# Patient Record
Sex: MALE | Race: WHITE | NOT HISPANIC OR LATINO | Employment: FULL TIME | URBAN - METROPOLITAN AREA
[De-identification: names, ages, dates, MRNs, and addresses within clinical notes are randomized per-mention and may not be internally consistent; named-entity substitution may affect disease eponyms.]

---

## 2017-04-06 ENCOUNTER — OFFICE VISIT (OUTPATIENT)
Dept: PODIATRY | Facility: CLINIC | Age: 42
End: 2017-04-06
Payer: COMMERCIAL

## 2017-04-06 VITALS — BODY MASS INDEX: 27.52 KG/M2 | HEIGHT: 76 IN | WEIGHT: 226 LBS

## 2017-04-06 DIAGNOSIS — M77.52 BURSITIS OF LEFT FOOT: Primary | ICD-10-CM

## 2017-04-06 PROCEDURE — 99203 OFFICE O/P NEW LOW 30 MIN: CPT | Performed by: PODIATRIST

## 2017-04-06 NOTE — LETTER
4/6/2017       RE: Tru Almanza  [address] [address] [facility] [address]           Dear Colleague,    Thank you for referring your patient, Tru Almanza, to the [facility]. Please see a copy of my visit note below.    PATIENT HISTORY:  Tru Almanza is a 41 year old male who presents to clinic for a painful left foot .  The patient describes the pain as sharp stabbing.  The patient relates pain is located around the fifth metatarsophalangeal joint on the left foot.  The patient relates the pain has been present for the past several weeks.  The patient relates pain with ambulation.  The patient has tried different shoes and questions with little relief.       REVIEW OF SYSTEMS:  Constitutional, HEENT, cardiovascular, pulmonary, GI, , musculoskeletal, neuro, skin, endocrine and psych systems are negative, except as otherwise noted.     PAST MEDICAL HISTORY:   Past Medical History:   Diagnosis Date     Pericarditis         PAST SURGICAL HISTORY:   Past Surgical History:   Procedure Laterality Date     VASECTOMY  12/12/2011    Procedure:VASECTOMY; Surgical Exploration of the Scrotum,Excision of Vas Deferens Vasectomy Clip,Vasectomy; Surgeon:ALFONSO MCGOVERN; Location:[facility] OR        MEDICATIONS:   Current Outpatient Prescriptions:      order for DME, Equipment being ordered: Dynaflex insert, Disp: 1 Units, Rfl: 0     ALLERGIES:    Allergies   Allergen Reactions     Erythromycin         SOCIAL HISTORY:   Social History     Social History     Marital status:      Spouse name: N/A     Number of children: N/A     Years of education: N/A     Occupational History     Not on file.     Social History Main Topics     Smoking status: Never Smoker     Smokeless tobacco: Never Used     Alcohol use No     Drug use: No     Sexual activity: Yes     Partners: Female     Other Topics Concern     Not on file     Social History Narrative        FAMILY HISTORY:   Family History  "  Problem Relation Age of Onset     Thyroid Disease Mother      Hypertension Father      Breast Cancer Maternal Grandmother         EXAM:Vitals: Ht 1.93 m (6' 4\")  Wt 102.5 kg (226 lb)  BMI 27.51 kg/m2  BMI= Body mass index is 27.51 kg/(m^2).    Weight management plan: Patient was referred to their PCP to discuss a diet and exercise plan.    General appearance: Patient is alert and fully cooperative with history & exam.  No sign of distress is noted during the visit.     Psychiatric: Affect is pleasant & appropriate.  Patient appears motivated to improve health.     Respiratory: Breathing is regular & unlabored while sitting.     HEENT: Hearing is intact to spoken word.  Speech is clear.  No gross evidence of visual impairment that would impact ambulation.     Dermatologic: Skin is intact to both lower extremities without significant lesions, rash or abrasion.  No paronychia or evidence of soft tissue infection is noted.     Vascular: DP & PT pulses are intact & regular bilaterally.  No significant edema or varicosities noted.  CFT and skin temperature is normal to both lower extremities.     Neurologic: Lower extremity sensation is intact to light touch.  No evidence of weakness or contracture in the lower extremities.  No evidence of neuropathy.     Musculoskeletal: Patient is ambulatory without assistive device or brace.  No gross ankle deformity noted.  No foot or ankle joint effusion is noted.  Noted pain on palpation on the plantar lateral aspect of the fifth metatarsophalangeal joint on the left foot. One can palpate a firm soft tissue mass in this area. No surrounding erythema noted.       ASSESSMENT / PLAN:     ICD-10-CM    1. Bursitis of left foot M71.572 order for [facility]     SPORTS MEDICINE REFERRAL       I have explained to Tru  about the conditions.  We discussed the nature of the condition as well as the treatment plan and expected length of recovery.  At this time, the patient was referred to Dr." Ciro Glover for an ultrasound-guided steroid injection of the bursa on the left foot. The patient was instructed to return to the office if symptoms do not resolve.      Disclaimer: This note consists of symbols derived from keyboarding, dictation and/or voice recognition software. As a result, there may be errors in the script that have gone undetected. Please consider this when interpreting information found in this chart.       MARYANA Chisholm D.P.M., F.A.C.F.A.S.        Again, thank you for allowing me to participate in the care of your patient.        Sincerely,              Yariel Chisholm DPM

## 2017-04-06 NOTE — PATIENT INSTRUCTIONS
Initial musculoskeletal treatment recommendation:    1.  Stretch the calf muscles as instructed once an hour.  2.  Ice the injured area in the evening; 20 min on/off.  3.  Take antiinflammatory medication as directed.  4.  Massage the soft tissues around the injured area in the morning to loosen the tissue  5.  Wear supportive foot wear    If no improvement in symptoms within four to six weeks, return to clinic for reevaluation.

## 2017-04-06 NOTE — MR AVS SNAPSHOT
After Visit Summary   4/6/2017    Tru Almanza    MRN: [id number]           Patient Information     Date Of Birth          1975        Visit Information        Provider Department      4/6/2017 9:40 AM Yariel Chisholm DPM [facility]        Today's Diagnoses     Bursitis of left foot    -  1      Care Instructions    Initial musculoskeletal treatment recommendation:    1.  Stretch the calf muscles as instructed once an hour.  2.  Ice the injured area in the evening; 20 min on/off.  3.  Take antiinflammatory medication as directed.  4.  Massage the soft tissues around the injured area in the morning to loosen the tissue  5.  Wear supportive foot wear    If no improvement in symptoms within four to six weeks, return to clinic for reevaluation.          Follow-ups after your visit        Additional Services     SPORTS MEDICINE REFERRAL       Your provider has referred you to:  Dr. Ciro Glover, FMG: [facility] [phone]   [email or web address]  ULTRASOUND GUIDED INJECTION OF BURSA LEFT FOOT  Please be aware that coverage of these services is subject to the terms and limitations of your health insurance plan.  Call member services at your health plan with any benefit or coverage questions.      Please bring the following to your appointment:    >>   Any x-rays, CTs or MRIs which have been performed.  Contact the facility where they were done to arrange for  prior to your scheduled appointment.  Any new CT, MRI or other procedures ordered by your specialist must be performed at a [place] facility or coordinated by your clinic's referral office.    >>   List of current medications   >>   This referral request   >>   Any documents/labs given to you for this referral                  Follow-up notes from your care team     Return in about 3 months  "(around 7/6/2017), or if symptoms worsen or fail to improve.      Who to contact     If you have questions or need follow up information about today's clinic visit or your schedule please contact [facility] directly at [phone].  Normal or non-critical lab and imaging results will be communicated to you by MyChart, letter or phone within 4 business days after the clinic has received the results. If you do not hear from us within 7 days, please contact the clinic through [employer] or phone. If you have a critical or abnormal lab result, we will notify you by phone as soon as possible.  Submit refill requests through [employer] or call your pharmacy and they will forward the refill request to us. Please allow 3 business days for your refill to be completed.          Additional Information About Your Visit        [facility] Information     [employer] gives you secure access to your electronic health record. If you see a primary care provider, you can also send messages to your care team and make appointments. If you have questions, please call your primary care clinic.  If you do not have a primary care provider, please call [phone] and they will assist you.        Care EveryWhere ID     This is your Care EveryWhere ID. This could be used by other organizations to access your [place] medical records  [id number]        Your Vitals Were     Height BMI (Body Mass Index)                1.93 m (6' 4\") 27.51 kg/m2           Blood Pressure from Last 3 Encounters:   10/11/16 124/72   09/14/16 120/70   05/13/14 120/76    Weight from Last 3 Encounters:   04/06/17 102.5 kg (226 lb)   10/11/16 96.1 kg (211 lb 12.8 oz)   09/14/16 97.4 kg (214 lb 12.8 oz)              We Performed the Following     SPORTS MEDICINE REFERRAL          Today's Medication Changes          These changes are accurate as of: 4/6/17 10:07 AM.  If you have any questions, ask your nurse or doctor.               Start taking " these medicines.        Dose/Directions    order for DME   Used for:  Bursitis of left foot   Started by:  Yariel Chisholm DPM        Equipment being ordered: Dynaflex insert   Quantity:  1 Units   Refills:  0            Where to get your medicines      Some of these will need a paper prescription and others can be bought over the counter.  Ask your nurse if you have questions.     Bring a paper prescription for each of these medications     order for DME                Primary Care Provider Office Phone # Fax #    Dori Avilez [phone]       [facility] [address] [place] [id number]        Thank you!     Thank you for choosing [facility]  for your care. Our goal is always to provide you with excellent care. Hearing back from our patients is one way we can continue to improve our services. Please take a few minutes to complete the written survey that you may receive in the mail after your visit with us. Thank you!             Your Updated Medication List - Protect others around you: Learn how to safely use, store and throw away your medicines at www.disposemymeds.org.          This list is accurate as of: 4/6/17 10:07 AM.  Always use your most recent med list.                   Brand Name Dispense Instructions for use    order for DME     1 Units    Equipment being ordered: Dynaflex insert

## 2017-04-07 NOTE — PROGRESS NOTES
"PATIENT HISTORY:  Tru Almanza is a 41 year old male who presents to clinic for a painful left foot .  The patient describes the pain as sharp stabbing.  The patient relates pain is located around the fifth metatarsophalangeal joint on the left foot.  The patient relates the pain has been present for the past several weeks.  The patient relates pain with ambulation.  The patient has tried different shoes and questions with little relief.       REVIEW OF SYSTEMS:  Constitutional, HEENT, cardiovascular, pulmonary, GI, , musculoskeletal, neuro, skin, endocrine and psych systems are negative, except as otherwise noted.     PAST MEDICAL HISTORY:   Past Medical History:   Diagnosis Date     Pericarditis         PAST SURGICAL HISTORY:   Past Surgical History:   Procedure Laterality Date     VASECTOMY  12/12/2011    Procedure:VASECTOMY; Surgical Exploration of the Scrotum,Excision of Vas Deferens Vasectomy Clip,Vasectomy; Surgeon:ALFONSO MCGOVERN; Location:[facility] OR        MEDICATIONS:   Current Outpatient Prescriptions:      order for DME, Equipment being ordered: Dynaflex insert, Disp: 1 Units, Rfl: 0     ALLERGIES:    Allergies   Allergen Reactions     Erythromycin         SOCIAL HISTORY:   Social History     Social History     Marital status:      Spouse name: N/A     Number of children: N/A     Years of education: N/A     Occupational History     Not on file.     Social History Main Topics     Smoking status: Never Smoker     Smokeless tobacco: Never Used     Alcohol use No     Drug use: No     Sexual activity: Yes     Partners: Female     Other Topics Concern     Not on file     Social History Narrative        FAMILY HISTORY:   Family History   Problem Relation Age of Onset     Thyroid Disease Mother      Hypertension Father      Breast Cancer Maternal Grandmother         EXAM:Vitals: Ht 1.93 m (6' 4\")  Wt 102.5 kg (226 lb)  BMI 27.51 kg/m2  BMI= Body mass index is 27.51 kg/(m^2).    Weight management plan: " Patient was referred to their PCP to discuss a diet and exercise plan.    General appearance: Patient is alert and fully cooperative with history & exam.  No sign of distress is noted during the visit.     Psychiatric: Affect is pleasant & appropriate.  Patient appears motivated to improve health.     Respiratory: Breathing is regular & unlabored while sitting.     HEENT: Hearing is intact to spoken word.  Speech is clear.  No gross evidence of visual impairment that would impact ambulation.     Dermatologic: Skin is intact to both lower extremities without significant lesions, rash or abrasion.  No paronychia or evidence of soft tissue infection is noted.     Vascular: DP & PT pulses are intact & regular bilaterally.  No significant edema or varicosities noted.  CFT and skin temperature is normal to both lower extremities.     Neurologic: Lower extremity sensation is intact to light touch.  No evidence of weakness or contracture in the lower extremities.  No evidence of neuropathy.     Musculoskeletal: Patient is ambulatory without assistive device or brace.  No gross ankle deformity noted.  No foot or ankle joint effusion is noted.  Noted pain on palpation on the plantar lateral aspect of the fifth metatarsophalangeal joint on the left foot. One can palpate a firm soft tissue mass in this area. No surrounding erythema noted.       ASSESSMENT / PLAN:     ICD-10-CM    1. Bursitis of left foot M71.572 order for DME     SPORTS MEDICINE REFERRAL       I have explained to Tru  about the conditions.  We discussed the nature of the condition as well as the treatment plan and expected length of recovery.  At this time, the patient was referred to Dr. Ciro Glover for an ultrasound-guided steroid injection of the bursa on the left foot. The patient was instructed to return to the office if symptoms do not resolve.      Disclaimer: This note consists of symbols derived from keyboarding, dictation and/or voice recognition  software. As a result, there may be errors in the script that have gone undetected. Please consider this when interpreting information found in this chart.       MARYANA Chisholm D.P.M., CHARLENE.F.SVETLANAS.

## 2017-04-12 ENCOUNTER — OFFICE VISIT (OUTPATIENT)
Dept: ORTHOPEDICS | Facility: CLINIC | Age: 42
End: 2017-04-12
Payer: COMMERCIAL

## 2017-04-12 VITALS
HEIGHT: 76 IN | BODY MASS INDEX: 27.52 KG/M2 | SYSTOLIC BLOOD PRESSURE: 118 MMHG | DIASTOLIC BLOOD PRESSURE: 78 MMHG | WEIGHT: 226 LBS

## 2017-04-12 DIAGNOSIS — M77.52 BURSITIS OF LEFT FOOT: Primary | ICD-10-CM

## 2017-04-12 PROCEDURE — 20606 DRAIN/INJ JOINT/BURSA W/US: CPT | Mod: LT | Performed by: FAMILY MEDICINE

## 2017-04-12 NOTE — LETTER
2017         RE: Tru Almanza  [address] [facility] [address]        Dear Colleague,    Thank you for referring your patient, Tru Almanza, to the [facility] [place]. Please see a copy of my visit note below.    Tru Almanza  :  1975  DOS: 2017  MRN: [id number]    Sports Medicine Clinic Procedure    Ultrasound Guided left foot bursa Injection    Clinical History: Tru Almanza is a 41 year old male who presents to clinic for a painful left foot . The patient describes the pain as sharp stabbing. The patient relates pain is located around the fifth metatarsophalangeal joint on the left foot. The patient relates the pain has been present for the past several weeks. The patient relates pain with ambulation. The patient has tried different shoes and questions with little relief.     Diagnosis:   1. Bursitis of left foot      Referring Physician: MARYANA Chisholm DPM  Technique: The risks of the procedure were explained to the patient.  A consent was signed for the left side 5th metatrasal joint bursa injection.  The patient was evaluated with a [employer] ultrasound machine using a 12 MHz linear probe.     The right foot was prepped and draped in a sterile manner.  Ultrasound identification of left 5th metatarsal bursa in both long and short axis.  The location of adjacent neurovascular structures were noted.  The probe was placed in long axis to the base of 5th metatarsal left side .  A 1.5 inch 25 gauge needle was placed under ultrasound guidance into the second TMT joint.  A mixture of 1 ml's 1% lidocaine and 1 ml kenalog (40mg/ml) was injected without difficulty on long axis view.  The needle was removed and there was good hemostasis without complications.  There was ultrasound documentation of needle placement and injection.  Pre-procedure pain 6/10. Post-procedure pain 4/10.      Impression:  Successful Left foot bursa  injection    Plan:  Follow up with Dr Chisholm as previously discussed.  Expectations and goals of CSI reviewed  Often 2-3 days for steroid effect, and can take up to two weeks for maximum effect  We discussed modified progressive pain-free activity as tolerated  Do not overuse in first two weeks if feeling better due to concern for vulnerability while steroid is working  Supportive care reviewed  All questions were answered today  Contact us with additional questions or concerns  Signs and sx of concern reviewed      Ciro Glover DO, CAQ  Primary Care Sports Medicine  [facility]             Again, thank you for allowing me to participate in the care of your patient.        Sincerely,        Ciro Glover DO

## 2017-04-12 NOTE — PROGRESS NOTES
Tru Almanza  :  1975  DOS: 2017  MRN: [id number]    Sports Medicine Clinic Procedure    Ultrasound Guided left foot bursa Injection    Clinical History: Tru Almanza is a 41 year old male who presents to clinic for a painful left foot . The patient describes the pain as sharp stabbing. The patient relates pain is located around the fifth metatarsophalangeal joint on the left foot. The patient relates the pain has been present for the past several weeks. The patient relates pain with ambulation. The patient has tried different shoes and questions with little relief.     Diagnosis:   1. Bursitis of left foot      Referring Physician: MARYANA Chisholm DPM  Technique: The risks of the procedure were explained to the patient.  A consent was signed for the left side 5th metatrasal joint bursa injection.  The patient was evaluated with a [employer] ultrasound machine using a 12 MHz linear probe.     The right foot was prepped and draped in a sterile manner.  Ultrasound identification of left 5th metatarsal bursa in both long and short axis.  The location of adjacent neurovascular structures were noted.  The probe was placed in long axis to the base of 5th metatarsal left side .  A 1.5 inch 25 gauge needle was placed under ultrasound guidance into the second TMT joint.  A mixture of 1 ml's 1% lidocaine and 1 ml kenalog (40mg/ml) was injected without difficulty on long axis view.  The needle was removed and there was good hemostasis without complications.  There was ultrasound documentation of needle placement and injection.  Pre-procedure pain 6/10. Post-procedure pain 4/10.      Impression:  Successful Left foot bursa injection    Plan:  Follow up with Dr Chisholm as previously discussed.  Expectations and goals of CSI reviewed  Often 2-3 days for steroid effect, and can take up to two weeks for maximum effect  We discussed modified progressive pain-free activity as tolerated  Do not overuse in  first two weeks if feeling better due to concern for vulnerability while steroid is working  Supportive care reviewed  All questions were answered today  Contact us with additional questions or concerns  Signs and sx of concern reviewed      Ciro Glover DO, CAFAINA  Primary Care Sports Medicine  [facility]

## 2017-04-12 NOTE — NURSING NOTE
"Chief Complaint   Patient presents with     Musculoskeletal Problem     left foot - US injection       Initial /78  Ht 6' 4\" (1.93 m)  Wt 226 lb (102.5 kg)  BMI 27.51 kg/m2 Estimated body mass index is 27.51 kg/(m^2) as calculated from the following:    Height as of this encounter: 6' 4\" (1.93 m).    Weight as of this encounter: 226 lb (102.5 kg).  Medication Reconciliation: complete     Agustín Bautista ATC  "

## 2017-07-02 ENCOUNTER — HOSPITAL ENCOUNTER (EMERGENCY)
Facility: CLINIC | Age: 42
Discharge: HOME | End: 2017-07-02
Attending: FAMILY MEDICINE | Admitting: FAMILY MEDICINE
Payer: COMMERCIAL

## 2017-07-02 VITALS
DIASTOLIC BLOOD PRESSURE: 93 MMHG | RESPIRATION RATE: 15 BRPM | TEMPERATURE: 97.2 F | OXYGEN SATURATION: 97 % | SYSTOLIC BLOOD PRESSURE: 146 MMHG

## 2017-07-02 DIAGNOSIS — R07.9 ACUTE CHEST PAIN: ICD-10-CM

## 2017-07-02 LAB
ALBUMIN SERPL-MCNC: 3.4 G/DL (ref 3.4–5)
ALP SERPL-CCNC: 52 U/L (ref 40–150)
ALT SERPL W P-5'-P-CCNC: 25 U/L (ref 0–70)
ANION GAP SERPL CALCULATED.3IONS-SCNC: 7 MMOL/L (ref 3–14)
AST SERPL W P-5'-P-CCNC: 20 U/L (ref 0–45)
BASOPHILS # BLD AUTO: 0 10E9/L (ref 0–0.2)
BASOPHILS NFR BLD AUTO: 0.3 %
BILIRUB SERPL-MCNC: 0.3 MG/DL (ref 0.2–1.3)
BUN SERPL-MCNC: 17 MG/DL (ref 7–30)
CALCIUM SERPL-MCNC: 8.6 MG/DL (ref 8.5–10.1)
CHLORIDE SERPL-SCNC: 107 MMOL/L (ref 94–109)
CO2 SERPL-SCNC: 26 MMOL/L (ref 20–32)
CREAT SERPL-MCNC: 0.81 MG/DL (ref 0.66–1.25)
CRP SERPL-MCNC: <2.9 MG/L (ref 0–8)
DIFFERENTIAL METHOD BLD: NORMAL
EOSINOPHIL # BLD AUTO: 0.1 10E9/L (ref 0–0.7)
EOSINOPHIL NFR BLD AUTO: 0.9 %
ERYTHROCYTE [DISTWIDTH] IN BLOOD BY AUTOMATED COUNT: 13 % (ref 10–15)
ERYTHROCYTE [SEDIMENTATION RATE] IN BLOOD BY WESTERGREN METHOD: 4 MM/H (ref 0–15)
GFR SERPL CREATININE-BSD FRML MDRD: ABNORMAL ML/MIN/1.7M2
GLUCOSE SERPL-MCNC: 118 MG/DL (ref 70–99)
HCT VFR BLD AUTO: 43.3 % (ref 40–53)
HGB BLD-MCNC: 14.7 G/DL (ref 13.3–17.7)
IMM GRANULOCYTES # BLD: 0 10E9/L (ref 0–0.4)
IMM GRANULOCYTES NFR BLD: 0.2 %
LYMPHOCYTES # BLD AUTO: 2.6 10E9/L (ref 0.8–5.3)
LYMPHOCYTES NFR BLD AUTO: 28.9 %
MCH RBC QN AUTO: 29.6 PG (ref 26.5–33)
MCHC RBC AUTO-ENTMCNC: 33.9 G/DL (ref 31.5–36.5)
MCV RBC AUTO: 87 FL (ref 78–100)
MONOCYTES # BLD AUTO: 0.9 10E9/L (ref 0–1.3)
MONOCYTES NFR BLD AUTO: 9.6 %
NEUTROPHILS # BLD AUTO: 5.3 10E9/L (ref 1.6–8.3)
NEUTROPHILS NFR BLD AUTO: 60.1 %
PLATELET # BLD AUTO: 298 10E9/L (ref 150–450)
POTASSIUM SERPL-SCNC: 3.8 MMOL/L (ref 3.4–5.3)
PROT SERPL-MCNC: 6.6 G/DL (ref 6.8–8.8)
RBC # BLD AUTO: 4.97 10E12/L (ref 4.4–5.9)
SODIUM SERPL-SCNC: 140 MMOL/L (ref 133–144)
TROPONIN I SERPL-MCNC: NORMAL UG/L (ref 0–0.04)
WBC # BLD AUTO: 8.8 10E9/L (ref 4–11)

## 2017-07-02 PROCEDURE — 84484 ASSAY OF TROPONIN QUANT: CPT | Performed by: FAMILY MEDICINE

## 2017-07-02 PROCEDURE — 99285 EMERGENCY DEPT VISIT HI MDM: CPT | Mod: 25

## 2017-07-02 PROCEDURE — 93010 ELECTROCARDIOGRAM REPORT: CPT | Performed by: FAMILY MEDICINE

## 2017-07-02 PROCEDURE — 25000132 ZZH RX MED GY IP 250 OP 250 PS 637: Performed by: FAMILY MEDICINE

## 2017-07-02 PROCEDURE — 93308 TTE F-UP OR LMTD: CPT

## 2017-07-02 PROCEDURE — 99285 EMERGENCY DEPT VISIT HI MDM: CPT | Mod: 25 | Performed by: FAMILY MEDICINE

## 2017-07-02 PROCEDURE — 93308 TTE F-UP OR LMTD: CPT | Mod: 26 | Performed by: FAMILY MEDICINE

## 2017-07-02 PROCEDURE — 93005 ELECTROCARDIOGRAM TRACING: CPT

## 2017-07-02 PROCEDURE — 86140 C-REACTIVE PROTEIN: CPT | Performed by: FAMILY MEDICINE

## 2017-07-02 PROCEDURE — 85652 RBC SED RATE AUTOMATED: CPT | Performed by: FAMILY MEDICINE

## 2017-07-02 PROCEDURE — 85025 COMPLETE CBC W/AUTO DIFF WBC: CPT | Performed by: FAMILY MEDICINE

## 2017-07-02 PROCEDURE — 25000125 ZZHC RX 250: Performed by: FAMILY MEDICINE

## 2017-07-02 PROCEDURE — 36415 COLL VENOUS BLD VENIPUNCTURE: CPT

## 2017-07-02 PROCEDURE — 80053 COMPREHEN METABOLIC PANEL: CPT | Performed by: FAMILY MEDICINE

## 2017-07-02 RX ADMIN — LIDOCAINE HYDROCHLORIDE 30 ML: 20 SOLUTION ORAL; TOPICAL at 19:41

## 2017-07-02 NOTE — ED PROVIDER NOTES
HPI  Patient is a 41-year-old male presenting with chest pain.  He has a known history of bacterial (strep) pericarditis occurring 11 years ago.  He was hospitalized for this problem.  Known history of depression.  No medication on regular basis.  Not a smoker.  He will have alcohol once a week.  He drinks 2-3 cups of coffee daily.  No additional pop or energy drinks.  He uses ibuprofen intermittently for headache but this is not increased recently.    Patient describes midline upper chest pain over the past week.  This is been constant.  He feels it in his mid back as well.  Bending over seems to worsen his pain now.  Food does not seem to exacerbate his symptoms.  He denies obvious reflux or heartburn.  No cough.  No congestion.  No shortness of breath.  No leg pain or swelling.  No trauma or injury.  No lightheadedness or fainting.  No palpitations.  No fever.  No skin rash.    ROS: All other review of systems are negative other than that noted above.   PMH: Reviewed.  SH: Reviewed.  FH: Reviewed.      PHYSICAL  /84  Temp 97.2  F (36.2  C) (Temporal)  Resp 14  SpO2 97%  General: Patient is alert and in mild distress.  Concerned.  Neurological: Alert.  Moving upper and lower extremities equally, bilaterally.  Head / Neck: Atraumatic.  Ears: Not done.  Eyes: Pupils are equal, round, and reactive.  Normal conjunctiva.  Nose: Midline.  No epistaxis.  Mouth / Throat: No ulcerations or lesions.  Upper pharynx is not erythematous.  Moist.  Respiratory: No respiratory distress. CTA B.  Cardiovascular: Regular rhythm.  Peripheral extremities are warm.  No edema.  No calf tenderness.  Abdomen / Pelvis: Not tender.  No distention.  Soft throughout.  Genitalia: Not done.  Musculoskeletal: No tenderness over major muscles and joints.  Skin: No evidence of rash or trauma.        PHYSICIAN  1813.  Patient has chest pain, as above.  EKG will be documented below.  CBC, comp panel, troponin ordered.  Bedside ultrasound  will be documented below.    Labs Ordered and Resulted from Time of ED Arrival Up to the Time of Departure from the ED   COMPREHENSIVE METABOLIC PANEL - Abnormal; Notable for the following:        Result Value    Glucose 118 (*)     Protein Total 6.6 (*)     All other components within normal limits   CBC WITH PLATELETS DIFFERENTIAL   TROPONIN I   ERYTHROCYTE SEDIMENTATION RATE AUTO   PROCALCITONIN   CRP INFLAMMATION       EKG  (1822)   Rate: 71     Rhythm: sinus     Axis: nl  Intervals: AZ (12-2) 164, QRS (<12) 164, QTc (>5) 397  P wave: nl     QRS complex: RBBB  ST segment / T-wave: nl  Conclusion: RBBB    PROCEDURE  Limited Bedside ED Cardiac Ultrasound:  PROCEDURE: PERFORMED BY: Dr. Lamont Blum  INDICATIONS/SYMPTOM:  Chest Pain  PROBE: Cardiac phased array probe  BODY LOCATION: Chest  FINDINGS:   The ultrasound was performed utilizing the subcostal, parasternal long axis, parasternal short axis and apical 4 chamber views.  Cardiac contractility:  Present  Gross estimation of cardiac kinesis: normal  Pericardial Effusion:  None  RV:LV ratio: Equal  IVC:  Diameter: IVC diameter expiration (IVCe) 3 cm                     IVC diameter inspiration (IVCi) 3 cm                                                                         Collapsibility: IVC collapses < 50% with inspiration  INTERPRETATION:    Chamber size and motion were grossly normal with LV > RV, normal cardiac kinesis.  No pericardial effusion was found.  IVC visualized and findings indicate normovolemia.  IMAGE DOCUMENTATION: Images were archived to hard drive.    1938.  Patient has a normal white blood cell count, normal CRP, and a normal sed rate.  Procalcitonin still pending.  EKG shows a new right bundle branch block, as above.  No ischemic change and a troponin test is negative.  No concern for acute coronary syndrome.  Patient is agreeable to trying a GI cocktail.    2011.  No change after the GI cocktail.  Low concern for severe underlying  cause of symptoms but I do not have an obvious answer for why he has pain.  He will follow up with his primary doctor this week for reevaluation.  Ibuprofen and Tylenol for pain control.  Return for worsening as discussed.      IMPRESSION    ICD-10-CM    1. Acute chest pain R07.9            Critical Care time:  none                      Lamont Blum MD  07/02/17 2011

## 2017-07-02 NOTE — ED AVS SNAPSHOT
[facility]    [address] [facility] [address]    Phone:  [phone]    Fax:  [phone]                                       Tru Almanza   MRN: [id number]    Department:  [facility]   Date of Visit:  7/2/2017           After Visit Summary Signature Page     I have received my discharge instructions, and my questions have been answered. I have discussed any challenges I see with this plan with the nurse or doctor.    ..........................................................................................................................................  Patient/Patient Representative Signature      ..........................................................................................................................................  Patient Representative Print Name and Relationship to Patient    ..................................................               ................................................  Date                                            Time    ..........................................................................................................................................  Reviewed by Signature/Title    ...................................................              ..............................................  Date                                                            Time

## 2017-07-02 NOTE — ED AVS SNAPSHOT
[facility]    [address] [address]    Phone:  [phone]    Fax:  [phone]                                       Tru Almanza   MRN: [id number]    Department:  [facility]   Date of Visit:  7/2/2017           Patient Information     Date Of Birth          1975        Your diagnoses for this visit were:     Acute chest pain        You were seen by Lamont Blum MD.        Discharge Instructions       Return to the Emergency Room if the following occurs:     Worsened pain, fever >101, worsened breathing, or for any concern at anytime.    Or, follow-up with the following provider as we discussed:     Return to your primary doctor this week for reevaluation.    Medications discussed:    Ibuprofen 600 mg every six hours for pain (7 days duration).  Tylenol 1000 mg every six hours for pain (7 days duration).  Therefore, you can alternate these every three hours and do it safely.    If you received pain-relieving or sedating medication during your time in the ER, avoid alcohol, driving automobiles, or working with machinery.  Also, a responsible adult must stay with you.      If you had X-rays or labs done we will attempt to contact you if there is a change needed in your care.      Call the Nurse Advice Line at [phone] or [phone] for any concern at anytime.      24 Hour Appointment Hotline       To make an appointment at any [facility], call [phone]). If you don't have a family doctor or clinic, we will help you find one. [facility] clinics are conveniently located to serve the needs of you and your family.             Review of your medicines      Our records show that you are taking the medicines listed below. If these are incorrect, please call your family doctor or clinic.        Dose / Directions Last dose taken    order for DME   Quantity:  1 Units        Equipment being ordered:  Dynaflex insert   Refills:  0                Procedures and tests performed during your visit     CBC with platelets, differential    CRP inflammation    Comprehensive metabolic panel    EKG 12 lead    Erythrocyte sedimentation rate auto    POC US ECHO LIMITED    Troponin I      Orders Needing Specimen Collection     None      Pending Results     No orders found from 6/30/2017 to 7/3/2017.            Pending Culture Results     No orders found from 6/30/2017 to 7/3/2017.            Pending Results Instructions     If you had any lab results that were not finalized at the time of your Discharge, you can call the ED Lab Result RN at [phone]. You will be contacted by this team for any positive Lab results or changes in treatment. The nurses are available 7 days a week from 10A to 6:30P.  You can leave a message 24 hours per day and they will return your call.        Test Results From Your Hospital Stay        7/2/2017  6:38 PM      Component Results     Component Value Ref Range & Units Status    WBC 8.8 4.0 - 11.0 10e9/L Final    RBC Count 4.97 4.4 - 5.9 10e12/L Final    Hemoglobin 14.7 13.3 - 17.7 g/dL Final    Hematocrit 43.3 40.0 - 53.0 % Final    MCV 87 78 - 100 fl Final    MCH 29.6 26.5 - 33.0 pg Final    MCHC 33.9 31.5 - 36.5 g/dL Final    RDW 13.0 10.0 - 15.0 % Final    Platelet Count 298 150 - 450 10e9/L Final    Diff Method Automated Method  Final    % Neutrophils 60.1 % Final    % Lymphocytes 28.9 % Final    % Monocytes 9.6 % Final    % Eosinophils 0.9 % Final    % Basophils 0.3 % Final    % Immature Granulocytes 0.2 % Final    Absolute Neutrophil 5.3 1.6 - 8.3 10e9/L Final    Absolute Lymphocytes 2.6 0.8 - 5.3 10e9/L Final    Absolute Monocytes 0.9 0.0 - 1.3 10e9/L Final    Absolute Eosinophils 0.1 0.0 - 0.7 10e9/L Final    Absolute Basophils 0.0 0.0 - 0.2 10e9/L Final    Abs Immature Granulocytes 0.0 0 - 0.4 10e9/L Final         7/2/2017  6:59 PM      Component Results     Component Value Ref Range &  Units Status    Sodium 140 133 - 144 mmol/L Final    Potassium 3.8 3.4 - 5.3 mmol/L Final    Chloride 107 94 - 109 mmol/L Final    Carbon Dioxide 26 20 - 32 mmol/L Final    Anion Gap 7 3 - 14 mmol/L Final    Glucose 118 (H) 70 - 99 mg/dL Final    Urea Nitrogen 17 7 - 30 mg/dL Final    Creatinine 0.81 0.66 - 1.25 mg/dL Final    GFR Estimate >90  Non  GFR Calc   >60 mL/min/1.7m2 Final    GFR Estimate If Black >90   GFR Calc   >60 mL/min/1.7m2 Final    Calcium 8.6 8.5 - 10.1 mg/dL Final    Bilirubin Total 0.3 0.2 - 1.3 mg/dL Final    Albumin 3.4 3.4 - 5.0 g/dL Final    Protein Total 6.6 (L) 6.8 - 8.8 g/dL Final    Alkaline Phosphatase 52 40 - 150 U/L Final    ALT 25 0 - 70 U/L Final    AST 20 0 - 45 U/L Final         7/2/2017  6:59 PM      Component Results     Component Value Ref Range & Units Status    Troponin I ES  0.000 - 0.045 ug/L Final    <0.015  The 99th percentile for upper reference range is 0.045 ug/L.  Troponin values in   the range of 0.045 - 0.120 ug/L may be associated with risks of adverse   clinical events.           7/2/2017  6:41 PM      [facility] Procedure Note      Limited Bedside ED Cardiac Ultrasound:    PROCEDURE: PERFORMED BY: Dr. Lamont Blum  INDICATIONS/SYMPTOM:  Chest Pain  PROBE: Cardiac phased array probe  BODY LOCATION: Chest  FINDINGS:   The ultrasound was performed utilizing the subcostal, parasternal long axis, parasternal short axis and apical 4 chamber views.  Cardiac contractility:  Present  Gross estimation of cardiac kinesis: normal  Pericardial Effusion:  None  RV:LV ratio: Equal  IVC:    Diameter:  IVC diameter expiration (IVCe) 3 cm                                                   IVC diameter inspiration (IVCi) 3 cm                                                       Collapsibility:  IVC collapses < 50% with inspiration  INTERPRETATION:    Chamber size and motion were grossly normal with LV > RV, normal cardiac  kinesis.  No pericardial effusion was found.  IVC visualized and findings indicate normovolemia.  IMAGE DOCUMENTATION: Images were archived to hard drive.                     7/2/2017  7:11 PM      Component Results     Component Value Ref Range & Units Status    Sed Rate 4 0 - 15 mm/h Final               7/2/2017  6:59 PM      Component Results     Component Value Ref Range & Units Status    CRP Inflammation <2.9 0.0 - 8.0 mg/L Final                Thank you for choosing [facility]       Thank you for choosing [facility] for your care. Our goal is always to provide you with excellent care. Hearing back from our patients is one way we can continue to improve our services. Please take a few minutes to complete the written survey that you may receive in the mail after you visit with us. Thank you!        [employer] Information     [employer] gives you secure access to your electronic health record. If you see a primary care provider, you can also send messages to your care team and make appointments. If you have questions, please call your primary care clinic.  If you do not have a primary care provider, please call [phone] and they will assist you.        Care EveryWhere ID     This is your Care EveryWhere ID. This could be used by other organizations to access your [facility] medical records  [id number]        Equal Access to Services     YORDAN BRAR : Hadii tamica Rose, wawilianda yasmany, qajanetta kaalmada jesus alberto, adolfo trejo. So [facility] [phone].    ATENCIÓN: Si habla español, tiene a carrillo disposición servicios gratuitos de asistencia lingüística. Lashaun al [phone].    We comply with applicable federal civil rights laws and [place] laws. We do not discriminate on the basis of race, color, national origin, age, disability sex, sexual orientation or gender identity.            After Visit Summary       This is your record. Keep this with you and show to your community  pharmacist(s) and doctor(s) at your next visit.

## 2017-07-03 NOTE — DISCHARGE INSTRUCTIONS
Return to the Emergency Room if the following occurs:     Worsened pain, fever >101, worsened breathing, or for any concern at anytime.    Or, follow-up with the following provider as we discussed:     Return to your primary doctor this week for reevaluation.    Medications discussed:    Ibuprofen 600 mg every six hours for pain (7 days duration).  Tylenol 1000 mg every six hours for pain (7 days duration).  Therefore, you can alternate these every three hours and do it safely.    If you received pain-relieving or sedating medication during your time in the ER, avoid alcohol, driving automobiles, or working with machinery.  Also, a responsible adult must stay with you.      If you had X-rays or labs done we will attempt to contact you if there is a change needed in your care.      Call the Nurse Advice Line at [phone] or [phone] for any concern at anytime.

## 2017-09-18 ENCOUNTER — MYC MEDICAL ADVICE (OUTPATIENT)
Dept: FAMILY MEDICINE | Facility: CLINIC | Age: 42
End: 2017-09-18

## 2017-09-18 DIAGNOSIS — R07.9 ACUTE CHEST PAIN: Primary | ICD-10-CM

## 2017-09-29 ENCOUNTER — HOSPITAL ENCOUNTER (OUTPATIENT)
Dept: CARDIOLOGY | Facility: CLINIC | Age: 42
Discharge: HOME | End: 2017-09-29
Attending: FAMILY MEDICINE | Admitting: FAMILY MEDICINE
Payer: COMMERCIAL

## 2017-09-29 DIAGNOSIS — R07.9 ACUTE CHEST PAIN: ICD-10-CM

## 2017-09-29 PROCEDURE — 93306 TTE W/DOPPLER COMPLETE: CPT | Mod: 26 | Performed by: INTERNAL MEDICINE

## 2017-09-29 PROCEDURE — 93306 TTE W/DOPPLER COMPLETE: CPT

## 2017-10-27 ENCOUNTER — OFFICE VISIT (OUTPATIENT)
Dept: PODIATRY | Facility: CLINIC | Age: 42
End: 2017-10-27
Payer: COMMERCIAL

## 2017-10-27 VITALS — HEART RATE: 76 BPM | RESPIRATION RATE: 16 BRPM | BODY MASS INDEX: 28.01 KG/M2 | HEIGHT: 76 IN | WEIGHT: 230 LBS

## 2017-10-27 DIAGNOSIS — L60.0 INGROWING NAIL, RIGHT GREAT TOE: Primary | ICD-10-CM

## 2017-10-27 PROCEDURE — 11750 EXCISION NAIL&NAIL MATRIX: CPT | Mod: T5 | Performed by: PODIATRIST

## 2017-10-27 PROCEDURE — 99213 OFFICE O/P EST LOW 20 MIN: CPT | Mod: 25 | Performed by: PODIATRIST

## 2017-10-27 NOTE — LETTER
10/27/2017         RE: Tru Almanza  [address] [address]        Dear Colleague,    Thank you for referring your patient, Tru Almanza, to the [facility]. Please see a copy of my visit note below.    Tru Almanza is a 41 year old male who presents with a chief complain of a painful ingrown toenail to the right great toe.  The patient relates the ingrown nail was first noticed several weeks ago and has steadily become worse.  The patient relates the medial nail border is inflamed and sore to the touch.  The patient relates no bloody drainage.  The patient denies any redness extending up the big toe.  The patient has been treating the big toe by soaking it in salt water and antibiotics with no relief.       REVIEW OF SYSTEMS:  Constitutional, HEENT, cardiovascular, pulmonary, GI, , musculoskeletal, neuro, skin, endocrine and psych systems are negative, except as otherwise noted.     PAST MEDICAL HISTORY:   Past Medical History:   Diagnosis Date     Pericarditis         PAST SURGICAL HISTORY:   Past Surgical History:   Procedure Laterality Date     VASECTOMY  12/12/2011    Procedure:VASECTOMY; Surgical Exploration of the Scrotum,Excision of Vas Deferens Vasectomy Clip,Vasectomy; Surgeon:ALFONSO MCGOVERN; Location:[place] OR        MEDICATIONS:   Current Outpatient Prescriptions:      order for DME, Equipment being ordered: Dynaflex insert (Patient not taking: Reported on 10/27/2017), Disp: 1 Units, Rfl: 0     ALLERGIES:    Allergies   Allergen Reactions     Erythromycin         SOCIAL HISTORY:   Social History     Social History     Marital status:      Spouse name: N/A     Number of children: N/A     Years of education: N/A     Occupational History     Not on file.     Social History Main Topics     Smoking status: Never Smoker     Smokeless tobacco: Never Used     Alcohol use No     Drug use: No     Sexual activity: Yes     Partners: Female     Other Topics  "Concern     Not on file     Social History Narrative        FAMILY HISTORY:   Family History   Problem Relation Age of Onset     Thyroid Disease Mother      Hypertension Father      Breast Cancer Maternal Grandmother         EXAM:Vitals: Pulse 76  Resp 16  Ht 1.93 m (6' 4\")  Wt 104.3 kg (230 lb)  BMI 28 kg/m2  BMI= Body mass index is 28 kg/(m^2).  Weight management plan: Patient was referred to their PCP to discuss a diet and exercise plan.    General appearance: Patient is alert and fully cooperative with history & exam.  No sign of distress is noted during the visit.     Psychiatric: Affect is pleasant & appropriate.  Patient appears motivated to improve health.     Respiratory: Breathing is regular & unlabored while sitting.     HEENT: Hearing is intact to spoken word.  Speech is clear.  No gross evidence of visual impairment that would impact ambulation.     Dermatologic: Skin is intact to both lower extremities without significant lesions, rash or abrasion.  Noted paronychia.     Vascular: DP & PT pulses are intact & regular bilaterally.  No significant edema or varicosities noted.  CFT and skin temperature is normal to both lower extremities.     Neurologic: Lower extremity sensation is intact to light touch.  No evidence of weakness or contracture in the lower extremities.  No evidence of neuropathy.     Musculoskeletal: Patient is ambulatory without assistive device or brace.  No gross ankle deformity noted.  No foot or ankle joint effusion is noted.    One notes an inflamed medial nail border of the right great toe.  There is noted erythema and edema located around the medial labial fold and does not extend past the interphalangeal joint.  There is no apparent purulent drainage noted.  There is pain on palpation to the proximal aspect of the medial nail border.      Assessment:  1.  Paranychia to the medial aspect of the right great toe.      Plan:  I have explained to Tru about the condition.  The " potential causes and nature of an ingrown toenail were discussed with the patient.  We reviewed the natural history/prognosis of the condition and potential risks if no treatment is provided.      Treatment options discussed included conservative management (oral antibiotics, soaking of foot, adequate width shoes)  as well as surgical management (partial or total nail removal).  The pros and cons of both forms of treatment were reviewed.      After thorough discussion and answering all questions, the patient elected to proceed with surgery.    At this point, I recommended having the offending nail border permanently removed.  I have explained to the patient all of the possible risks, benefits, alternatives to the procedure.  The patient consented to the proposed procedure.  The right hallux was swabbed with alcohol.  Next, approximately 5 cc of 1% lidocaine plain was injected around the right hallux.  The right hallux was then prepped with Betadine ointment.  A tourniquet was applied to the right hallux.  The offending nail border was split using an English anvil back to the matrix.  Next, utilizing a straight hemostat the offending nail border was avulsed with the attached matrix.  The wound bed was debrided on any remaining nail, matrix and skin.  Next, the offending nail border was treated with 89% phenol using microtip cotton applicators for 30 seconds.  The wound was then irrigated and dressed with Triple antibiotic ointment and a compressive dry sterile dressing.  The tourniquet was removed and a prompt hyperemic response was noted.  The patient tolerated the procedure well with no complications.  The patient was given post procedure instructions for the care of the wound.      Disclaimer: This note consists of symbols derived from keyboarding, dictation and/or voice recognition software. As a result, there may be errors in the script that have gone undetected. Please consider this when interpreting  information found in this chart.      MARYANA Chisholm D.P.M., CHARLENE.F.A.S.        Again, thank you for allowing me to participate in the care of your patient.        Sincerely,        Yariel Chisholm DPM

## 2017-10-27 NOTE — PROGRESS NOTES
Tru Almanza is a 41 year old male who presents with a chief complain of a painful ingrown toenail to the right great toe.  The patient relates the ingrown nail was first noticed several weeks ago and has steadily become worse.  The patient relates the medial nail border is inflamed and sore to the touch.  The patient relates no bloody drainage.  The patient denies any redness extending up the big toe.  The patient has been treating the big toe by soaking it in salt water and antibiotics with no relief.       REVIEW OF SYSTEMS:  Constitutional, HEENT, cardiovascular, pulmonary, GI, , musculoskeletal, neuro, skin, endocrine and psych systems are negative, except as otherwise noted.     PAST MEDICAL HISTORY:   Past Medical History:   Diagnosis Date     Pericarditis         PAST SURGICAL HISTORY:   Past Surgical History:   Procedure Laterality Date     VASECTOMY  12/12/2011    Procedure:VASECTOMY; Surgical Exploration of the Scrotum,Excision of Vas Deferens Vasectomy Clip,Vasectomy; Surgeon:ALFONSO MCGOVERN; Location:[place] [facility]        MEDICATIONS:   Current Outpatient Prescriptions:      order for DME, Equipment being ordered: Dynaflex insert (Patient not taking: Reported on 10/27/2017), Disp: 1 Units, Rfl: 0     ALLERGIES:    Allergies   Allergen Reactions     Erythromycin         SOCIAL HISTORY:   Social History     Social History     Marital status:      Spouse name: N/A     Number of children: N/A     Years of education: N/A     Occupational History     Not on file.     Social History Main Topics     Smoking status: Never Smoker     Smokeless tobacco: Never Used     Alcohol use No     Drug use: No     Sexual activity: Yes     Partners: Female     Other Topics Concern     Not on file     Social History Narrative        FAMILY HISTORY:   Family History   Problem Relation Age of Onset     Thyroid Disease Mother      Hypertension Father      Breast Cancer Maternal Grandmother         EXAM:Vitals: Pulse 76   "Resp 16  Ht 1.93 m (6' 4\")  Wt 104.3 kg (230 lb)  BMI 28 kg/m2  BMI= Body mass index is 28 kg/(m^2).  Weight management plan: Patient was referred to their PCP to discuss a diet and exercise plan.    General appearance: Patient is alert and fully cooperative with history & exam.  No sign of distress is noted during the visit.     Psychiatric: Affect is pleasant & appropriate.  Patient appears motivated to improve health.     Respiratory: Breathing is regular & unlabored while sitting.     HEENT: Hearing is intact to spoken word.  Speech is clear.  No gross evidence of visual impairment that would impact ambulation.     Dermatologic: Skin is intact to both lower extremities without significant lesions, rash or abrasion.  Noted paronychia.     Vascular: DP & PT pulses are intact & regular bilaterally.  No significant edema or varicosities noted.  CFT and skin temperature is normal to both lower extremities.     Neurologic: Lower extremity sensation is intact to light touch.  No evidence of weakness or contracture in the lower extremities.  No evidence of neuropathy.     Musculoskeletal: Patient is ambulatory without assistive device or brace.  No gross ankle deformity noted.  No foot or ankle joint effusion is noted.    One notes an inflamed medial nail border of the right great toe.  There is noted erythema and edema located around the medial labial fold and does not extend past the interphalangeal joint.  There is no apparent purulent drainage noted.  There is pain on palpation to the proximal aspect of the medial nail border.      Assessment:  1.  Paranychia to the medial aspect of the right great toe.      Plan:  I have explained to Tru about the condition.  The potential causes and nature of an ingrown toenail were discussed with the patient.  We reviewed the natural history/prognosis of the condition and potential risks if no treatment is provided.      Treatment options discussed included conservative " management (oral antibiotics, soaking of foot, adequate width shoes)  as well as surgical management (partial or total nail removal).  The pros and cons of both forms of treatment were reviewed.      After thorough discussion and answering all questions, the patient elected to proceed with surgery.    At this point, I recommended having the offending nail border permanently removed.  I have explained to the patient all of the possible risks, benefits, alternatives to the procedure.  The patient consented to the proposed procedure.  The right hallux was swabbed with alcohol.  Next, approximately 5 cc of 1% lidocaine plain was injected around the right hallux.  The right hallux was then prepped with Betadine ointment.  A tourniquet was applied to the right hallux.  The offending nail border was split using an English anvil back to the matrix.  Next, utilizing a straight hemostat the offending nail border was avulsed with the attached matrix.  The wound bed was debrided on any remaining nail, matrix and skin.  Next, the offending nail border was treated with 89% phenol using microtip cotton applicators for 30 seconds.  The wound was then irrigated and dressed with Triple antibiotic ointment and a compressive dry sterile dressing.  The tourniquet was removed and a prompt hyperemic response was noted.  The patient tolerated the procedure well with no complications.  The patient was given post procedure instructions for the care of the wound.      Disclaimer: This note consists of symbols derived from keyboarding, dictation and/or voice recognition software. As a result, there may be errors in the script that have gone undetected. Please consider this when interpreting information found in this chart.      MARYANA Chisholm D.P.M., F.ELLEN.[email or web address].[email or web address].S.

## 2017-10-27 NOTE — NURSING NOTE
"Chief Complaint   Patient presents with     Procedure     ingrowing toenail right great toe       Initial Pulse 76  Resp 16  Ht 1.93 m (6' 4\")  Wt 104.3 kg (230 lb)  BMI 28 kg/m2 Estimated body mass index is 28 kg/(m^2) as calculated from the following:    Height as of this encounter: 1.93 m (6' 4\").    Weight as of this encounter: 104.3 kg (230 lb).  Medication Reconciliation: complete     Kenia Osuna CMA      "

## 2017-10-27 NOTE — MR AVS SNAPSHOT
After Visit Summary   10/27/2017    Tru Almanza    MRN: [id number]           Patient Information     Date Of Birth          1975        Visit Information        Provider Department      10/27/2017 10:00 AM Yariel Chisholm DPM [facility]        Today's Diagnoses     Ingrowing nail, right great toe    -  1      Care Instructions    Post toenail procedure instructions:    1.  Keep bandage on the rest of the day; take off in the morning.  2.  Soak the toe in warm water with Epsom's Salt or Dreft detergent for 20 min.  3.  Clean out any scab tissue from the treated area with a toothpick or Q-tip.  4.  Apply a topical antibiotic to the treated area and bandage with a Band-Aid.  5.  Repeat morning and night for two weeks            Follow-ups after your visit        Follow-up notes from your care team     Return if symptoms worsen or fail to improve.      Your next 10 appointments already scheduled     Nov 08, 2017  9:00 AM CST   Office Visit with Dori Avilez MD   [facility]    [address] [place] [id number] [phone]           Bring a current list of meds and any records pertaining to this visit. For Physicals, please bring immunization records and any forms needing to be filled out. Please arrive 10 minutes early to complete paperwork.              Who to contact     If you have questions or need follow up information about today's clinic visit or your schedule please contact [facility] directly at [phone].  Normal or non-critical lab and imaging results will be communicated to you by MyChart, letter or phone within 4 business days after the clinic has received the results. If you do not hear from us within 7 days, please contact the clinic through MyChart or phone. If you have a critical or abnormal lab result, we will notify you by phone as soon as possible.  Submit refill requests  "through [employer] or call your pharmacy and they will forward the refill request to us. Please allow 3 business days for your refill to be completed.          Additional Information About Your Visit        [employer] Information     [employer] gives you secure access to your electronic health record. If you see a primary care provider, you can also send messages to your care team and make appointments. If you have questions, please call your primary care clinic.  If you do not have a primary care provider, please call [phone] and they will assist you.        Care EveryWhere ID     This is your Care EveryWhere ID. This could be used by other organizations to access your [facility] medical records  [id number]        Your Vitals Were     Pulse Respirations Height BMI (Body Mass Index)          76 16 1.93 m (6' 4\") 28 kg/m2         Blood Pressure from Last 3 Encounters:   07/02/17 (!) 146/93   04/12/17 118/78   10/11/16 124/72    Weight from Last 3 Encounters:   10/27/17 104.3 kg (230 lb)   04/12/17 102.5 kg (226 lb)   04/06/17 102.5 kg (226 lb)              We Performed the Following     REMOVAL NAIL/NAIL BED, PARTIAL OR COMPLETE        Primary Care Provider Office Phone # Fax #    Dori Avilez [phone] [address] [place] [address]        Equal Access to Services     YORDAN BRAR : Hadii aad ku hadasho Soomaali, waaxda luqadaha, qaybta kaalmada adeegyada, adolfo trejo. So [facility] [phone].    ATENCIÓN: Si habla español, tiene a carrillo disposición servicios gratuitos de asistencia lingüística. Lashaun al [phone].    We comply with applicable federal civil rights laws and [place] laws. We do not discriminate on the basis of race, color, national origin, age, disability, sex, sexual orientation, or gender identity.            Thank you!     Thank you for choosing [facility]  for your care. Our goal is always to provide you with excellent " care. Hearing back from our patients is one way we can continue to improve our services. Please take a few minutes to complete the written survey that you may receive in the mail after your visit with us. Thank you!             Your Updated Medication List - Protect others around you: Learn how to safely use, store and throw away your medicines at www.disposemymeds.org.          This list is accurate as of: 10/27/17 10:02 AM.  Always use your most recent med list.                   Brand Name Dispense Instructions for use Diagnosis    order for DME     1 Units    Equipment being ordered: Dynaflex insert    Bursitis of left foot

## 2018-01-07 RX ORDER — TRIAMCINOLONE ACETONIDE 40 MG/ML
40 INJECTION, SUSPENSION INTRA-ARTICULAR; INTRAMUSCULAR ONCE
Qty: 1 ML | Refills: 0 | OUTPATIENT
Start: 2018-01-07 | End: 2018-01-07

## 2018-07-27 ENCOUNTER — OFFICE VISIT (OUTPATIENT)
Dept: PODIATRY | Facility: CLINIC | Age: 43
End: 2018-07-27
Payer: COMMERCIAL

## 2018-07-27 ENCOUNTER — RADIANT APPOINTMENT (OUTPATIENT)
Dept: GENERAL RADIOLOGY | Facility: CLINIC | Age: 43
End: 2018-07-27
Attending: PODIATRIST
Payer: COMMERCIAL

## 2018-07-27 VITALS
WEIGHT: 218 LBS | RESPIRATION RATE: 16 BRPM | TEMPERATURE: 98.2 F | BODY MASS INDEX: 26.55 KG/M2 | HEART RATE: 64 BPM | DIASTOLIC BLOOD PRESSURE: 79 MMHG | SYSTOLIC BLOOD PRESSURE: 125 MMHG | HEIGHT: 76 IN

## 2018-07-27 DIAGNOSIS — M77.52 BURSITIS OF LEFT FOOT: ICD-10-CM

## 2018-07-27 DIAGNOSIS — M72.2 PLANTAR FASCIITIS, RIGHT: Primary | ICD-10-CM

## 2018-07-27 PROCEDURE — 73630 X-RAY EXAM OF FOOT: CPT | Mod: LT

## 2018-07-27 PROCEDURE — 99213 OFFICE O/P EST LOW 20 MIN: CPT | Performed by: PODIATRIST

## 2018-07-27 NOTE — NURSING NOTE
"Chief Complaint   Patient presents with     Left Foot - Pain     Right Foot - Pain       Initial /79 (BP Location: Right arm, Patient Position: Chair, Cuff Size: Adult Large)  Pulse 64  Temp 98.2  F (36.8  C) (Tympanic)  Resp 16  Ht 6' 4\" (1.93 m)  Wt 218 lb (98.9 kg)  BMI 26.54 kg/m2 Estimated body mass index is 26.54 kg/(m^2) as calculated from the following:    Height as of this encounter: 6' 4\" (1.93 m).    Weight as of this encounter: 218 lb (98.9 kg).  Medications and allergies reviewed.    Kenia NAGEL CMA    "

## 2018-07-27 NOTE — PROGRESS NOTES
PATIENT HISTORY:  Tru Almanza is a 42 year old male who returns to clinic for a painful right and left foot .  The patient describes the pain as sharp stabbing.  The patient relates the pain level is moderate.  The patient relates pain is located under the ball of the left foot and the heel of the right foot .  The patient relates the pain has been present for the past several months.  The patient relates pain with ambulation.  The patient has tried injection in different shoes with little relief.         REVIEW OF SYSTEMS:  Constitutional, HEENT, cardiovascular, pulmonary, GI, , musculoskeletal, neuro, skin, endocrine and psych systems are negative, except as otherwise noted.     PAST MEDICAL HISTORY:   Past Medical History:   Diagnosis Date     Pericarditis         PAST SURGICAL HISTORY:   Past Surgical History:   Procedure Laterality Date     VASECTOMY  12/12/2011    Procedure:VASECTOMY; Surgical Exploration of the Scrotum,Excision of Vas Deferens Vasectomy Clip,Vasectomy; Surgeon:ALFONSO MCGOVERN; Location:[place] [facility]        MEDICATIONS:   Current Outpatient Prescriptions:      order for DME, Equipment being ordered: Dynaflex insert (Patient not taking: Reported on 10/27/2017), Disp: 1 Units, Rfl: 0     ALLERGIES:    Allergies   Allergen Reactions     Erythromycin         SOCIAL HISTORY:   Social History     Social History     Marital status:      Spouse name: N/A     Number of children: N/A     Years of education: N/A     Occupational History     Not on file.     Social History Main Topics     Smoking status: Never Smoker     Smokeless tobacco: Never Used     Alcohol use No     Drug use: No     Sexual activity: Yes     Partners: Female     Other Topics Concern     Not on file     Social History Narrative        FAMILY HISTORY:   Family History   Problem Relation Age of Onset     Thyroid Disease Mother      Hypertension Father      Breast Cancer Maternal Grandmother         EXAM:Vitals: /79 (BP  "Location: Right arm, Patient Position: Chair, Cuff Size: Adult Large)  Pulse 64  Temp 98.2  F (36.8  C) (Tympanic)  Resp 16  Ht 6' 4\" (1.93 m)  Wt 218 lb (98.9 kg)  BMI 26.54 kg/m2  BMI= Body mass index is 26.54 kg/(m^2).    Weight management plan: Patient was referred to their PCP to discuss a diet and exercise plan.    General appearance: Patient is alert and fully cooperative with history & exam.  No sign of distress is noted during the visit.     Psychiatric: Affect is pleasant & appropriate.  Patient appears motivated to improve health.     Respiratory: Breathing is regular & unlabored while sitting.     HEENT: Hearing is intact to spoken word.  Speech is clear.  No gross evidence of visual impairment that would impact ambulation.     Dermatologic: Skin is intact to both lower extremities without significant lesions, rash or abrasion.  No paronychia or evidence of soft tissue infection is noted.     Vascular: DP & PT pulses are intact & regular bilaterally.  No significant edema or varicosities noted.  CFT and skin temperature is normal to both lower extremities.     Neurologic: Lower extremity sensation is intact to light touch.  No evidence of weakness or contracture in the lower extremities.  No evidence of neuropathy.     Musculoskeletal: Patient is ambulatory without assistive device or brace.  No gross ankle deformity noted.  No foot or ankle joint effusion is noted.  Noted pain on palpation of the plantar aspect of the fifth metatarsal head on the left foot.  One can palpate a soft tissue nodule.  One notes pain on palpation of the plantar aspect of the right heel at the insertion point of the plantar fascia.    Radiographs were evaluated including AP, lateral and medial oblique views of the left foot reveals  no cortical erosions or periosteal elevation.  All joint margins appear stable.  There is no apparent fracture or tumor formation noted.  There is no evidence of foreign body.    ASSESSMENT / " PLAN:     ICD-10-CM    1. Plantar fasciitis, right M72.2    2. Bursitis of left foot M71.572     fifth MTPJ        I have explained to Tru  about the conditions.  We discussed the nature of the condition as well as the treatment plan and expected length of recovery. The patient was instructed on icing, stretching, tissue massage and support.  The patient was fitted with a Dynaflex insert that will aid in offloading the tension forces to the soft tissues and prevent further inflammation.   The patient was referred to Dr. Ciro Glover for ultrasound-guided steroid injection of the soft tissue bursitis tissue on the left fifth metatarsophalangeal joint.  Patient was instructed to return to the office if problems persist.      Disclaimer: This note consists of symbols derived from keyboarding, dictation and/or voice recognition software. As a result, there may be errors in the script that have gone undetected. Please consider this when interpreting information found in this chart.       MARYANA Chisholm D.P.M., F.A.C.F.A.S.

## 2018-07-27 NOTE — LETTER
7/27/2018         RE: Tru Almanza  [address] [facility] [address]        Dear Colleague,    Thank you for referring your patient, Tru Almanza, to the [facility]. Please see a copy of my visit note below.    PATIENT HISTORY:  Tru Almanza is a 42 year old male who returns to clinic for a painful right and left foot .  The patient describes the pain as sharp stabbing.  The patient relates the pain level is moderate.  The patient relates pain is located under the ball of the left foot and the heel of the right foot .  The patient relates the pain has been present for the past several months.  The patient relates pain with ambulation.  The patient has tried injection in different shoes with little relief.         REVIEW OF SYSTEMS:  Constitutional, HEENT, cardiovascular, pulmonary, GI, , musculoskeletal, neuro, skin, endocrine and psych systems are negative, except as otherwise noted.     PAST MEDICAL HISTORY:   Past Medical History:   Diagnosis Date     Pericarditis         PAST SURGICAL HISTORY:   Past Surgical History:   Procedure Laterality Date     VASECTOMY  12/12/2011    Procedure:VASECTOMY; Surgical Exploration of the Scrotum,Excision of Vas Deferens Vasectomy Clip,Vasectomy; Surgeon:ALFONSO MCGOVERN; Location:[place] [facility]        MEDICATIONS:   Current Outpatient Prescriptions:      order for DME, Equipment being ordered: Dynaflex insert (Patient not taking: Reported on 10/27/2017), Disp: 1 Units, Rfl: 0     ALLERGIES:    Allergies   Allergen Reactions     Erythromycin         SOCIAL HISTORY:   Social History     Social History     Marital status:      Spouse name: N/A     Number of children: N/A     Years of education: N/A     Occupational History     Not on file.     Social History Main Topics     Smoking status: Never Smoker     Smokeless tobacco: Never Used     Alcohol use No     Drug use: No     Sexual activity: Yes     Partners: Female     Other Topics  "Concern     Not on file     Social History Narrative        FAMILY HISTORY:   Family History   Problem Relation Age of Onset     Thyroid Disease Mother      Hypertension Father      Breast Cancer Maternal Grandmother         EXAM:Vitals: /79 (BP Location: Right arm, Patient Position: Chair, Cuff Size: Adult Large)  Pulse 64  Temp 98.2  F (36.8  C) (Tympanic)  Resp 16  Ht 6' 4\" (1.93 m)  Wt 218 lb (98.9 kg)  BMI 26.54 kg/m2  BMI= Body mass index is 26.54 kg/(m^2).    Weight management plan: Patient was referred to their PCP to discuss a diet and exercise plan.    General appearance: Patient is alert and fully cooperative with history & exam.  No sign of distress is noted during the visit.     Psychiatric: Affect is pleasant & appropriate.  Patient appears motivated to improve health.     Respiratory: Breathing is regular & unlabored while sitting.     HEENT: Hearing is intact to spoken word.  Speech is clear.  No gross evidence of visual impairment that would impact ambulation.     Dermatologic: Skin is intact to both lower extremities without significant lesions, rash or abrasion.  No paronychia or evidence of soft tissue infection is noted.     Vascular: DP & PT pulses are intact & regular bilaterally.  No significant edema or varicosities noted.  CFT and skin temperature is normal to both lower extremities.     Neurologic: Lower extremity sensation is intact to light touch.  No evidence of weakness or contracture in the lower extremities.  No evidence of neuropathy.     Musculoskeletal: Patient is ambulatory without assistive device or brace.  No gross ankle deformity noted.  No foot or ankle joint effusion is noted.  Noted pain on palpation of the plantar aspect of the fifth metatarsal head on the left foot.  One can palpate a soft tissue nodule.  One notes pain on palpation of the plantar aspect of the right heel at the insertion point of the plantar fascia.    Radiographs were evaluated including " AP, lateral and medial oblique views of the left foot reveals  no cortical erosions or periosteal elevation.  All joint margins appear stable.  There is no apparent fracture or tumor formation noted.  There is no evidence of foreign body.    ASSESSMENT / PLAN:     ICD-10-CM    1. Plantar fasciitis, right M72.2    2. Bursitis of left foot M71.572     fifth MTPJ        I have explained to Tru  about the conditions.  We discussed the nature of the condition as well as the treatment plan and expected length of recovery. The patient was instructed on icing, stretching, tissue massage and support.  The patient was fitted with a Dynaflex insert that will aid in offloading the tension forces to the soft tissues and prevent further inflammation.   The patient was referred to Dr. Ciro Glover for ultrasound-guided steroid injection of the soft tissue bursitis tissue on the left fifth metatarsophalangeal joint.  Patient was instructed to return to the office if problems persist.      Disclaimer: This note consists of symbols derived from keyboarding, dictation and/or voice recognition software. As a result, there may be errors in the script that have gone undetected. Please consider this when interpreting information found in this chart.       SHONA Balbuena.P.M., F.A.C.F.A.S.        Again, thank you for allowing me to participate in the care of your patient.        Sincerely,        Yariel Chisholm DPM

## 2018-07-27 NOTE — MR AVS SNAPSHOT
After Visit Summary   7/27/2018    Tru Almanza    MRN: [id number]           Patient Information     Date Of Birth          1975        Visit Information        Provider Department      7/27/2018 9:20 AM Yariel Chisholm DPM [facility]        Today's Diagnoses     Plantar fasciitis, right    -  1    Bursitis of left foot           Follow-ups after your visit        Additional Services     SPORTS MEDICINE REFERRAL       Your provider has referred you to:  FM[facility]: [facility] [phone]   [email or web address]  DR JOHNY BRAVO FOR ULTRASOUND GUIDED INJECTION OF BURSA SUB FIFTH METATARSAL HEAD, LEFT FOOT  Please be aware that coverage of these services is subject to the terms and limitations of your health insurance plan.  Call member services at your health plan with any benefit or coverage questions.      Please bring the following to your appointment:    >>   Any x-rays, CTs or MRIs which have been performed.  Contact the facility where they were done to arrange for  prior to your scheduled appointment.  Any new CT, MRI or other procedures ordered by your specialist must be performed at a [place] facility or coordinated by your clinic's referral office.    >>   List of current medications   >>   This referral request   >>   Any documents/labs given to you for this referral                  Who to contact     If you have questions or need follow up information about today's clinic visit or your schedule please contact [facility] directly at [phone].  Normal or non-critical lab and imaging results will be communicated to you by MyChart, letter or phone within 4 business days after the clinic has received the results. If you do not hear from us within 7 days, please contact the clinic through MyChart or phone. If you have a critical or abnormal lab result, we will notify you by phone as soon as  "possible.  Submit refill requests through [employer] or call your pharmacy and they will forward the refill request to us. Please allow 3 business days for your refill to be completed.          Additional Information About Your Visit        [facility] Information     [employer] gives you secure access to your electronic health record. If you see a primary care provider, you can also send messages to your care team and make appointments. If you have questions, please call your primary care clinic.  If you do not have a primary care provider, please call [phone] and they will assist you.        Care EveryWhere ID     This is your Care EveryWhere ID. This could be used by other organizations to access your [place] medical records  [id number]        Your Vitals Were     Pulse Temperature Respirations Height BMI (Body Mass Index)       64 98.2  F (36.8  C) (Tympanic) 16 6' 4\" (1.93 m) 26.54 kg/m2        Blood Pressure from Last 3 Encounters:   07/27/18 125/79   07/02/17 (!) 146/93   04/12/17 118/78    Weight from Last 3 Encounters:   07/27/18 218 lb (98.9 kg)   10/27/17 230 lb (104.3 kg)   04/12/17 226 lb (102.5 kg)              We Performed the Following     SPORTS MEDICINE REFERRAL     XR Foot Left G/E 3 Views          Today's Medication Changes          These changes are accurate as of 7/27/18 11:00 AM.  If you have any questions, ask your nurse or doctor.               Start taking these medicines.        Dose/Directions    order for DME   Used for:  Bursitis of left foot, Plantar fasciitis, right   Started by:  Yariel Chisholm DPM        Equipment being ordered: Dynaflex insert   Quantity:  2 Units   Refills:  0            Where to get your medicines      Some of these will need a paper prescription and others can be bought over the counter.  Ask your nurse if you have questions.     Bring a paper prescription for each of these medications     order for DME                Primary Care Provider Office Phone # " Fax #    Dori Avilez [phone]       [address] [place] [address]        Equal Access to Services     YORDAN BRAR : Hadjeannie tamica mendoza harjit Soprincess, waaxda luqadaha, qaybta kaalmada jesus alberto, adolfo popegodwin maya. So [facility] [phone].    ATENCIÓN: Si habla español, tiene a carrillo disposición servicios gratuitos de asistencia lingüística. Llame al [phone].    We comply with applicable federal civil rights laws and [place] laws. We do not discriminate on the basis of race, color, national origin, age, disability, sex, sexual orientation, or gender identity.            Thank you!     Thank you for choosing [facility]  for your care. Our goal is always to provide you with excellent care. Hearing back from our patients is one way we can continue to improve our services. Please take a few minutes to complete the written survey that you may receive in the mail after your visit with us. Thank you!             Your Updated Medication List - Protect others around you: Learn how to safely use, store and throw away your medicines at www.disposemymeds.org.          This list is accurate as of 7/27/18 11:00 AM.  Always use your most recent med list.                   Brand Name Dispense Instructions for use Diagnosis    order for DME     1 Units    Equipment being ordered: Dynaflex insert    Bursitis of left foot       order for DME     2 Units    Equipment being ordered: Dynaflex insert    Bursitis of left foot, Plantar fasciitis, right

## 2018-08-09 ENCOUNTER — OFFICE VISIT (OUTPATIENT)
Dept: ORTHOPEDICS | Facility: CLINIC | Age: 43
End: 2018-08-09
Payer: COMMERCIAL

## 2018-08-09 VITALS
HEIGHT: 76 IN | WEIGHT: 218 LBS | DIASTOLIC BLOOD PRESSURE: 80 MMHG | BODY MASS INDEX: 26.55 KG/M2 | SYSTOLIC BLOOD PRESSURE: 119 MMHG

## 2018-08-09 DIAGNOSIS — M77.52 BURSITIS OF LEFT FOOT: Primary | ICD-10-CM

## 2018-08-09 PROCEDURE — 20606 DRAIN/INJ JOINT/BURSA W/US: CPT | Performed by: FAMILY MEDICINE

## 2018-08-09 RX ORDER — TRIAMCINOLONE ACETONIDE 40 MG/ML
20 INJECTION, SUSPENSION INTRA-ARTICULAR; INTRAMUSCULAR ONCE
Qty: 0.5 ML | Refills: 0 | OUTPATIENT
Start: 2018-08-09 | End: 2018-08-09

## 2018-08-09 NOTE — LETTER
2018         RE: Tru Almanza  [address] [address] [address]        Dear Colleague,    Thank you for referring your patient, Tru Almanza, to the [facility] [place]. Please see a copy of my visit note below.    Tru Almanza  :  1975  DOS: 18  MRN: [id number]    Sports Medicine Clinic Procedure    Ultrasound Guided left foot bursa Injection    Clinical History: Tru Almanza is a 41 year old male who presents to clinic for a painful left foot . The patient describes the pain as sharp stabbing. The patient relates pain is located around the fifth metatarsophalangeal joint on the left foot. The patient relates the pain has been present for the past several weeks. The patient relates pain with ambulation. The patient has tried different shoes and questions with little relief.     Diagnosis:   1. Bursitis of left foot      Referring Physician: MARYANA Chisholm DPM  Technique: The risks of the procedure were explained to the patient.  A consent was signed for the left side 5th metatrasal joint bursa injection.  The patient was evaluated with a [employer] ultrasound machine using a 12 MHz linear probe.     The right foot was prepped and draped in a sterile manner.  Ultrasound identification of left 5th metatarsal bursa in both long and short axis.  The location of adjacent neurovascular structures were noted.  The probe was placed in long axis to the base of 5th metatarsal left side .  A 1.5 inch 25 gauge needle was placed under ultrasound guidance into the left foot 5th metatarsal bursa.  A mixture of 1 ml's 1% lidocaine and 1 ml kenalog (40mg/ml) was injected without difficulty on long axis view.  The needle was removed and there was good hemostasis without complications.  There was ultrasound documentation of needle placement and injection.  Pre-procedure pain 3/10. Post-procedure pain 1-210.      Impression:  Successful Left foot bursa  injection    Plan:  Follow up with Dr Chisholm as previously discussed.  Expectations and goals of CSI reviewed  Often 2-3 days for steroid effect, and can take up to two weeks for maximum effect  We discussed modified progressive pain-free activity as tolerated  Do not overuse in first two weeks if feeling better due to concern for vulnerability while steroid is working  Supportive care reviewed  All questions were answered today  Contact us with additional questions or concerns  Signs and sx of concern reviewed      Ciro Glover DO, CAQ  Primary Care Sports Medicine  [facility]             Again, thank you for allowing me to participate in the care of your patient.        Sincerely,        Ciro Glover DO

## 2018-08-09 NOTE — PROGRESS NOTES
Tru Almanza  :  1975  DOS: 18  MRN: [id number]    Sports Medicine Clinic Procedure    Ultrasound Guided left foot bursa Injection    Clinical History: Tru Almanza is a 41 year old male who presents to clinic for a painful left foot . The patient describes the pain as sharp stabbing. The patient relates pain is located around the fifth metatarsophalangeal joint on the left foot. The patient relates the pain has been present for the past several weeks. The patient relates pain with ambulation. The patient has tried different shoes and questions with little relief.     Diagnosis:   1. Bursitis of left foot      Referring Physician: MARYANA Chisholm DPM  Technique: The risks of the procedure were explained to the patient.  A consent was signed for the left side 5th metatrasal joint bursa injection.  The patient was evaluated with a [employer] ultrasound machine using a 12 MHz linear probe.     The right foot was prepped and draped in a sterile manner.  Ultrasound identification of left 5th metatarsal bursa in both long and short axis.  The location of adjacent neurovascular structures were noted.  The probe was placed in long axis to the base of 5th metatarsal left side .  A 1.5 inch 25 gauge needle was placed under ultrasound guidance into the left foot 5th metatarsal bursa.  A mixture of 1 ml's 1% lidocaine and 1 ml kenalog (40mg/ml) was injected without difficulty on long axis view.  The needle was removed and there was good hemostasis without complications.  There was ultrasound documentation of needle placement and injection.  Pre-procedure pain 3/10. Post-procedure pain 1-2/10.      Impression:  Successful Left foot bursa injection    Plan:  Follow up with Dr Chisholm as previously discussed.  Expectations and goals of CSI reviewed  Often 2-3 days for steroid effect, and can take up to two weeks for maximum effect  We discussed modified progressive pain-free activity as tolerated  Do not  overuse in first two weeks if feeling better due to concern for vulnerability while steroid is working  Supportive care reviewed  All questions were answered today  Contact us with additional questions or concerns  Signs and sx of concern reviewed      Ciro Glover DO, ZEN  Primary Care Sports Medicine  [facility]

## 2018-08-09 NOTE — MR AVS SNAPSHOT
"              After Visit Summary   8/9/2018    Tru Almanza    MRN: [id number]           Patient Information     Date Of Birth          1975        Visit Information        Provider Department      8/9/2018 8:00 AM Ciro Glover,  [facility]        Today's Diagnoses     Bursitis of left foot    -  1       Follow-ups after your visit        Who to contact     If you have questions or need follow up information about today's clinic visit or your schedule please contact [facility] directly at [phone].  Normal or non-critical lab and imaging results will be communicated to you by [employer], letter or phone within 4 business days after the clinic has received the results. If you do not hear from us within 7 days, please contact the clinic through [facility] or phone. If you have a critical or abnormal lab result, we will notify you by phone as soon as possible.  Submit refill requests through [employer] or call your pharmacy and they will forward the refill request to us. Please allow 3 business days for your refill to be completed.          Additional Information About Your Visit        MyChart Information     [employer] gives you secure access to your electronic health record. If you see a primary care provider, you can also send messages to your care team and make appointments. If you have questions, please call your primary care clinic.  If you do not have a primary care provider, please call [phone] and they will assist you.        Care EveryWhere ID     This is your Care EveryWhere ID. This could be used by other organizations to access your [facility] medical records  [id number]        Your Vitals Were     Height BMI (Body Mass Index)                6' 4\" (1.93 m) 26.54 kg/m2           Blood Pressure from Last 3 Encounters:   08/09/18 119/80   07/27/18 125/79   07/02/17 (!) 146/93    Weight from Last 3 Encounters:   08/09/18 " 218 lb (98.9 kg)   07/27/18 218 lb (98.9 kg)   10/27/17 230 lb (104.3 kg)              We Performed the Following     Arthrocentesis Intermediate Jt w/US     TRIAMCINOLONE ACET INJ NOS          Today's Medication Changes          These changes are accurate as of 8/9/18  8:57 AM.  If you have any questions, ask your nurse or doctor.               Start taking these medicines.        Dose/Directions    triamcinolone acetonide 40 MG/ML injection   Commonly known as:  KENALOG-40   Used for:  Bursitis of left foot   Started by:  Ciro Glover DO        Dose:  20 mg   0.5 mLs (20 mg) by INTRA-ARTICULAR route once for 1 dose   Quantity:  0.5 mL   Refills:  0            Where to get your medicines      Some of these will need a paper prescription and others can be bought over the counter.  Ask your nurse if you have questions.     You don't need a prescription for these medications     triamcinolone acetonide 40 MG/ML injection                Primary Care Provider Office Phone # Fax #    Dori Avilez [phone]       [address] [place] [address]        Equal Access to Services     [facility] AH: Hadii tamica mnedoza hadanao Soprincess, waaxda luqadaha, qaybta kaalmada jesus alberto, adolfo muñiz . So [facility] [phone].    ATENCIÓN: Si habla español, tiene a carrillo disposición servicios gratuitos de asistencia lingüística. Ll[facility] [phone].    We comply with applicable federal civil rights laws and [place] laws. We do not discriminate on the basis of race, color, national origin, age, disability, sex, sexual orientation, or gender identity.            Thank you!     Thank you for choosing [facility]  for your care. Our goal is always to provide you with excellent care. Hearing back from our patients is one way we can continue to improve our services. Please take a few minutes to complete the written survey that you may receive in the  mail after your visit with us. Thank you!             Your Updated Medication List - Protect others around you: Learn how to safely use, store and throw away your medicines at www.disposemymeds.org.          This list is accurate as of 8/9/18  8:57 AM.  Always use your most recent med list.                   Brand Name Dispense Instructions for use Diagnosis    order for DME     1 Units    Equipment being ordered: Dynaflex insert    Bursitis of left foot       order for DME     2 Units    Equipment being ordered: Dynaflex insert    Bursitis of left foot, Plantar fasciitis, right       triamcinolone acetonide 40 MG/ML injection    KENALOG-40    0.5 mL    0.5 mLs (20 mg) by INTRA-ARTICULAR route once for 1 dose    Bursitis of left foot

## 2018-08-24 ENCOUNTER — OFFICE VISIT (OUTPATIENT)
Dept: PODIATRY | Facility: CLINIC | Age: 43
End: 2018-08-24
Payer: COMMERCIAL

## 2018-08-24 VITALS — WEIGHT: 218 LBS | HEIGHT: 76 IN | BODY MASS INDEX: 26.55 KG/M2 | HEART RATE: 65 BPM

## 2018-08-24 DIAGNOSIS — M77.52 LEFT ANKLE TENDINITIS: Primary | ICD-10-CM

## 2018-08-24 DIAGNOSIS — I89.0 LYMPHEDEMA OF LEFT LEG: ICD-10-CM

## 2018-08-24 PROCEDURE — 99213 OFFICE O/P EST LOW 20 MIN: CPT | Performed by: PODIATRIST

## 2018-08-24 RX ORDER — METHYLPREDNISOLONE 4 MG
4 TABLET, DOSE PACK ORAL SEE ADMIN INSTRUCTIONS
Qty: 21 TABLET | Refills: 0 | Status: SHIPPED | OUTPATIENT
Start: 2018-08-24 | End: 2018-10-05

## 2018-08-24 NOTE — LETTER
8/24/2018         RE: Tru Almanza  [id number] [facility] [address]        Dear Colleague,    Thank you for referring your patient, Tru Almanza, to the [facility]. Please see a copy of my visit note below.    Tru returns to the office for reevaluation of the left foot.  The patient relates following the instructions given at the last visit with noted more pain.  The patient relates overall less  improvement in pain and function of the left foot.  The patient relates no other problems.    PAST MEDICAL HISTORY:   Past Medical History:   Diagnosis Date     Pericarditis        BMI= Body mass index is 26.54 kg/(m^2).    Weight management plan: Patient was referred to their PCP to discuss a diet and exercise plan.    Physical Exam:    General: The patient appears to have a pleasant mental affect.    Lower extremity physical exam:  Neurovascular status is intact with palpable pedal pulses and intact epicritic sensations.  Muscular exam is within normal limits to major muscle groups.  Integument is intact.      One notes decreased edema.  One notes pain on palpation over the extensor tendons on the left foot and ankle.  Noted pain with active dorsiflexion against resistance on the left.  No surrounding erythema noted.       Assessment:      ICD-10-CM    1. Left ankle tendinitis M77.52 order for DME     order for DME     methylPREDNISolone (MEDROL DOSEPAK) 4 MG tablet   2. Lymphedema of left leg I89.0 order for DME       Plan:  I have explained to Tru about the conditions.  At this time, the patient was instructed on icing, stretching, tissue massage and support.  The patient was fitted with a Cam boot along with a Tri-Lock ankle brace that will aid in offloading the tension forces to the soft tissues and prevent further inflammation.  To reduce the amount of current inflammation, the patient was prescribed a Medrol Dosepak to be taken daily with food and instructed to stop  taking if any stomach irritation or swelling in extremities are noted.  The patient was prescribed compression stockings for edema control.  The patient will return in four weeks for reevaluation if the symptoms do not resolve.      Disclaimer: This note consists of symbols derived from keyboarding, dictation and/or voice recognition software. As a result, there may be errors in the script that have gone undetected. Please consider this when interpreting information found in this chart.       SHONA Balbuena.P.M., F.A.C.F.A.S.      Again, thank you for allowing me to participate in the care of your patient.        Sincerely,        Yariel Chisholm DPM

## 2018-08-24 NOTE — PROGRESS NOTES
Tru returns to the office for reevaluation of the left foot.  The patient relates following the instructions given at the last visit with noted more pain.  The patient relates overall less  improvement in pain and function of the left foot.  The patient relates no other problems.    PAST MEDICAL HISTORY:   Past Medical History:   Diagnosis Date     Pericarditis        BMI= Body mass index is 26.54 kg/(m^2).    Weight management plan: Patient was referred to their PCP to discuss a diet and exercise plan.    Physical Exam:    General: The patient appears to have a pleasant mental affect.    Lower extremity physical exam:  Neurovascular status is intact with palpable pedal pulses and intact epicritic sensations.  Muscular exam is within normal limits to major muscle groups.  Integument is intact.      One notes decreased edema.  One notes pain on palpation over the extensor tendons on the left foot and ankle.  Noted pain with active dorsiflexion against resistance on the left.  No surrounding erythema noted.       Assessment:      ICD-10-CM    1. Left ankle tendinitis M77.52 order for DME     order for DME     methylPREDNISolone (MEDROL DOSEPAK) 4 MG tablet   2. Lymphedema of left leg I89.0 order for DME       Plan:  I have explained to Tru about the conditions.  At this time, the patient was instructed on icing, stretching, tissue massage and support.  The patient was fitted with a Cam boot along with a Tri-Lock ankle brace that will aid in offloading the tension forces to the soft tissues and prevent further inflammation.  To reduce the amount of current inflammation, the patient was prescribed a Medrol Dosepak to be taken daily with food and instructed to stop taking if any stomach irritation or swelling in extremities are noted.  The patient was prescribed compression stockings for edema control.  The patient will return in four weeks for reevaluation if the symptoms do not resolve.      Disclaimer: This note  consists of symbols derived from keyboarding, dictation and/or voice recognition software. As a result, there may be errors in the script that have gone undetected. Please consider this when interpreting information found in this chart.       MARYANA Chisholm D.P.M., CHARLENE.F.A.S.

## 2018-08-24 NOTE — MR AVS SNAPSHOT
After Visit Summary   8/24/2018    Tru Almanza    MRN: [id number]           Patient Information     Date Of Birth          1975        Visit Information        Provider Department      8/24/2018 11:00 AM Yariel Chisholm DPM [facility]        Today's Diagnoses     Left ankle tendinitis    -  1      Care Instructions    Initial musculoskeletal treatment recommendation:    1.  Wear supportive foot wear (stiff soles) and/or arch supports (rigid not cushion).  2.  Stretch the calf muscles as instructed once an hour.  3.  Massage the soft tissues around the injured area in the morning to loosen the tissue  4.  Ice the injured area in the evening; 20 min on/off.  5. Take antiinflammatory medication as indicated.    If no improvement in symptoms within four to six weeks, return to clinic for reevaluation.            Follow-ups after your visit        Follow-up notes from your care team     Return in about 4 weeks (around 9/21/2018), or if symptoms worsen or fail to improve.      Who to contact     If you have questions or need follow up information about today's clinic visit or your schedule please contact [facility] directly at [phone].  Normal or non-critical lab and imaging results will be communicated to you by [employer], letter or phone within 4 business days after the clinic has received the results. If you do not hear from us within 7 days, please contact the clinic through [employer] or phone. If you have a critical or abnormal lab result, we will notify you by phone as soon as possible.  Submit refill requests through [employer] or call your pharmacy and they will forward the refill request to us. Please allow 3 business days for your refill to be completed.          Additional Information About Your Visit        MyChart Information     [employer] gives you secure access to your electronic health record. If you see a primary care provider, you can also  "send messages to your care team and make appointments. If you have questions, please call your primary care clinic.  If you do not have a primary care provider, please call [phone] and they will assist you.        Care EveryWhere ID     This is your Care EveryWhere ID. This could be used by other organizations to access your [place] medical records  [id number]        Your Vitals Were     Pulse Height BMI (Body Mass Index)             65 6' 4\" (1.93 m) 26.54 kg/m2          Blood Pressure from Last 3 Encounters:   08/09/18 119/80   07/27/18 125/79   07/02/17 (!) 146/93    Weight from Last 3 Encounters:   08/24/18 218 lb (98.9 kg)   08/09/18 218 lb (98.9 kg)   07/27/18 218 lb (98.9 kg)              Today, you had the following     No orders found for display         Today's Medication Changes          These changes are accurate as of 8/24/18 11:22 AM.  If you have any questions, ask your nurse or doctor.               Start taking these medicines.        Dose/Directions    methylPREDNISolone 4 MG tablet   Commonly known as:  MEDROL DOSEPAK   Used for:  Left ankle tendinitis   Started by:  Yariel Chisholm DPM        Dose:  4 mg   Take 1 tablet (4 mg) by mouth See Admin Instructions   Quantity:  21 tablet   Refills:  0       order for DME   Used for:  Left ankle tendinitis   Started by:  Yariel Chisholm DPM        CAM walker - short   Quantity:  1 Device   Refills:  0       order for DME   Used for:  Left ankle tendinitis   Started by:  Yariel Chisholm DPM        Trilok Ankle Brace   Quantity:  1 Device   Refills:  0            Where to get your medicines      [facility] [address]  [address], [facility] [address]     Phone:  [phone]     methylPREDNISolone 4 MG tablet         Some of these will need a paper prescription and others can be bought over the counter.  Ask your nurse if you have questions.     " Bring a paper prescription for each of these medications     order for DME    order for DME                Primary Care Provider Office Phone # Fax #    Dori Avilez [phone] [address] [place] [address]        Equal Access to Services     YORDAN BRAR : Ac mendoza isaiaho Soomaali, waaxda luqadaha, qaybta kaalmada adesangeetayada, adolfo jenkinsn diego garcia laAdelinanhan trejo. So [facility] [phone].    ATENCIÓN: Si habla español, tiene a carrillo disposición servicios gratuitos de asistencia lingüística. Llame al [phone].    We comply with applicable federal civil rights laws and [place] laws. We do not discriminate on the basis of race, color, national origin, age, disability, sex, sexual orientation, or gender identity.            Thank you!     Thank you for choosing [facility]  for your care. Our goal is always to provide you with excellent care. Hearing back from our patients is one way we can continue to improve our services. Please take a few minutes to complete the written survey that you may receive in the mail after your visit with us. Thank you!             Your Updated Medication List - Protect others around you: Learn how to safely use, store and throw away your medicines at www.disposemymeds.org.          This list is accurate as of 8/24/18 11:22 AM.  Always use your most recent med list.                   Brand Name Dispense Instructions for use Diagnosis    methylPREDNISolone 4 MG tablet    MEDROL DOSEPAK    21 tablet    Take 1 tablet (4 mg) by mouth See Admin Instructions    Left ankle tendinitis       order for DME     1 Units    Equipment being ordered: Dynaflex insert    Bursitis of left foot       order for DME     2 Units    Equipment being ordered: Dynaflex insert    Bursitis of left foot, Plantar fasciitis, right       order for DME     1 Device    CAM walker - short    Left ankle tendinitis       order for DME     1 Device    Trilok Ankle Brace     Left ankle tendinitis

## 2018-10-05 ENCOUNTER — OFFICE VISIT (OUTPATIENT)
Dept: PODIATRY | Facility: CLINIC | Age: 43
End: 2018-10-05
Payer: COMMERCIAL

## 2018-10-05 VITALS — HEIGHT: 76 IN | WEIGHT: 226 LBS | BODY MASS INDEX: 27.52 KG/M2 | RESPIRATION RATE: 16 BRPM

## 2018-10-05 DIAGNOSIS — I89.0 LYMPHEDEMA OF LEFT LEG: ICD-10-CM

## 2018-10-05 DIAGNOSIS — M72.2 PLANTAR FASCIITIS, RIGHT: Primary | ICD-10-CM

## 2018-10-05 DIAGNOSIS — M77.52 LEFT ANKLE TENDINITIS: ICD-10-CM

## 2018-10-05 PROCEDURE — 99213 OFFICE O/P EST LOW 20 MIN: CPT | Performed by: PODIATRIST

## 2018-10-05 RX ORDER — METHYLPREDNISOLONE 4 MG
4 TABLET, DOSE PACK ORAL SEE ADMIN INSTRUCTIONS
Qty: 21 TABLET | Refills: 0 | Status: SHIPPED | OUTPATIENT
Start: 2018-10-05 | End: 2018-11-30

## 2018-10-05 NOTE — PROGRESS NOTES
Tru returns to the office for reevaluation of the right foot.  The patient relates following the instructions given at the last visit with noted more pain.  The patient relates overall less  improvement in pain and function of the right foot.  The patient relates no other problems.    PAST MEDICAL HISTORY:   Past Medical History:   Diagnosis Date     Pericarditis        BMI= Body mass index is 27.51 kg/(m^2).    Weight management plan: Patient was referred to their PCP to discuss a diet and exercise plan.    Physical Exam:    General: The patient appears to have a pleasant mental affect.    Lower extremity physical exam:  Neurovascular status is intact with palpable pedal pulses and intact epicritic sensations.  Muscular exam is within normal limits to major muscle groups.  Integument is intact.      One notes decreased edema.  One notes pain on palpation under the right heel at the insertion point of the plantar fascia.  pain on palpation over the extensor tendons on the left ankle.  Noted pain with dorsiflexion against resistance.  No erythema noted.       Assessment:      ICD-10-CM    1. Plantar fasciitis, right M72.2 order for DME     ORTHOTICS REFERRAL     methylPREDNISolone (MEDROL DOSEPAK) 4 MG tablet   2. Left ankle tendinitis M77.52 order for DME     ORTHOTICS REFERRAL     methylPREDNISolone (MEDROL DOSEPAK) 4 MG tablet   3. Lymphedema of left leg I89.0 order for DME       Plan:  I have explained to Tru about the conditions.  At this time, the patient was instructed on icing, stretching, tissue massage and support.  The patient was referred to [facility] for custom orthotics that will aid in offloading the tension forces to the soft tissues and prevent further inflammation.  The patient was prescribed compression stockings.   To reduce the amount of current inflammation, the patient was prescribed a medrol dospak to be taken daily with food and instructed to stop taking if any  stomach irritation or swelling in extremities are noted.  The patient will return in four weeks for reevaluation if the symptoms do not resolve.        Disclaimer: This note consists of symbols derived from keyboarding, dictation and/or voice recognition software. As a result, there may be errors in the script that have gone undetected. Please consider this when interpreting information found in this chart.       MARYANA Chisholm D.P.M., F.ELLEN.FELICIANO.F.A.S.

## 2018-10-05 NOTE — MR AVS SNAPSHOT
After Visit Summary   10/5/2018    Tru Almanza    MRN: [id number]           Patient Information     Date Of Birth          1975        Visit Information        Provider Department      10/5/2018 9:20 AM Yariel Chisholm DPM [facility]        Today's Diagnoses     Plantar fasciitis, right    -  1    Left ankle tendinitis        Lymphedema of left leg          Care Instructions    Initial musculoskeletal treatment recommendation:    1.  Wear supportive foot wear (stiff soles) and/or arch supports (rigid not cushion).  2.  Stretch the calf muscles as instructed once an hour.  3.  Massage the soft tissues around the injured area in the morning to loosen the tissue  4.  Ice the injured area in the evening; 20 min on/off.  5. Take antiinflammatory medication as indicated.    If no improvement in symptoms within four to six weeks, return to clinic for reevaluation.            Follow-ups after your visit        Additional Services     ORTHOTICS REFERRAL       Please be aware that coverage of these services is subject to the terms and limitations of your health insurance plan.  Call member services at your health plan with any benefit or coverage questions.      Please bring the following to your appointment:    >>   Any x-rays, CTs or MRIs which have been performed.  Contact the facility where they were done to arrange for  prior to your scheduled appointment.  Any new CT, MRI or other procedures ordered by your specialist must be performed at a [place] facility or coordinated by your clinic's referral office.    >>   List of current medications   >>   This referral request   >>   Any documents/labs given to you for this referral    ==This Referral PRINTS in the [place] [facility] (ORTHOTICS & PROSTHETICS) Central scheduling office ==     The [facility] Central Scheduling staff will contact patient to arrange appointments. Central Scheduling Phone #:    "JAIME Mcnally  [phone]     Orthotics: Foot Orthotics                  Follow-up notes from your care team     Return in about 4 weeks (around 11/2/2018), or if symptoms worsen or fail to improve.      Who to contact     If you have questions or need follow up information about today's clinic visit or your schedule please contact [facility] directly at [phone].  Normal or non-critical lab and imaging results will be communicated to you by [employer], letter or phone within 4 business days after the clinic has received the results. If you do not hear from us within 7 days, please contact the clinic through Pinterestt or phone. If you have a critical or abnormal lab result, we will notify you by phone as soon as possible.  Submit refill requests through [employer] or call your pharmacy and they will forward the refill request to us. Please allow 3 business days for your refill to be completed.          Additional Information About Your Visit        [employer] Information     [employer] gives you secure access to your electronic health record. If you see a primary care provider, you can also send messages to your care team and make appointments. If you have questions, please call your primary care clinic.  If you do not have a primary care provider, please call [phone] and they will assist you.        Care EveryWhere ID     This is your Care EveryWhere ID. This could be used by other organizations to access your [place] medical records  [id number]        Your Vitals Were     Respirations Height BMI (Body Mass Index)             16 6' 4\" (1.93 m) 27.51 kg/m2          Blood Pressure from Last 3 Encounters:   08/09/18 119/80   07/27/18 125/79   07/02/17 (!) 146/93    Weight from Last 3 Encounters:   10/05/18 226 lb (102.5 kg)   08/24/18 218 lb (98.9 kg)   08/09/18 218 lb (98.9 kg)              We Performed the Following     ORTHOTICS REFERRAL          Today's Medication Changes          These changes are " accurate as of 10/5/18  9:47 AM.  If you have any questions, ask your nurse or doctor.               Start taking these medicines.        Dose/Directions    methylPREDNISolone 4 MG tablet   Commonly known as:  MEDROL DOSEPAK   Used for:  Plantar fasciitis, right, Left ankle tendinitis   Started by:  Yariel Chisholm DPM        Dose:  4 mg   Take 1 tablet (4 mg) by mouth See Admin Instructions   Quantity:  21 tablet   Refills:  0       order for DME   Used for:  Left ankle tendinitis, Plantar fasciitis, right, Lymphedema of left leg   Started by:  Yariel Chisholm DPM        20-30 mm Hg knee high Compression stockings   Quantity:  3 Units   Refills:  0            Where to get your medicines      These medications were sent to [facility] [address]  [address] [address]     Phone:  [phone]     methylPREDNISolone 4 MG tablet         Some of these will need a paper prescription and others can be bought over the counter.  Ask your nurse if you have questions.     Bring a paper prescription for each of these medications     order for DME                Primary Care Provider Office Phone # Fax #    Dori Avilez [phone]       [address] [place] [address]        Equal Access to Services     YORDAN BRAR AH: Ac lombardi Soprincess, waaxda luqadaha, qaybta kaalmada adesangeetayada, adolfo trejo. So [facility] [phone].    ATENCIÓN: Si habla español, tiene a carrillo disposición servicios gratuitos de asistencia lingüística. Lashaun al [phone].    We comply with applicable federal civil rights laws and [place] laws. We do not discriminate on the basis of race, color, national origin, age, disability, sex, sexual orientation, or gender identity.            Thank you!     Thank you for choosing [facility]  for your care. Our goal is always to provide you with excellent care.  Hearing back from our patients is one way we can continue to improve our services. Please take a few minutes to complete the written survey that you may receive in the mail after your visit with us. Thank you!             Your Updated Medication List - Protect others around you: Learn how to safely use, store and throw away your medicines at www.disposemymeds.org.          This list is accurate as of 10/5/18  9:47 AM.  Always use your most recent med list.                   Brand Name Dispense Instructions for use Diagnosis    ADVIL PO           methylPREDNISolone 4 MG tablet    MEDROL DOSEPAK    21 tablet    Take 1 tablet (4 mg) by mouth See Admin Instructions    Plantar fasciitis, right, Left ankle tendinitis       order for DME     1 Units    Equipment being ordered: Dynaflex insert    Bursitis of left foot       order for DME     2 Units    Equipment being ordered: Dynaflex insert    Bursitis of left foot, Plantar fasciitis, right       order for DME     1 Device    CAM walker - short    Left ankle tendinitis       order for DME     1 Device    Trilok Ankle Brace    Left ankle tendinitis       order for DME     1 Units    Compression stockings    Lymphedema of left leg       order for DME     3 Units    20-30 mm Hg knee high Compression stockings    Left ankle tendinitis, Plantar fasciitis, right, Lymphedema of left leg

## 2018-10-05 NOTE — NURSING NOTE
"Chief Complaint   Patient presents with     RECHECK     left foot pain and right foot plantar fasciitis       Initial Resp 16  Ht 6' 4\" (1.93 m)  Wt 226 lb (102.5 kg)  BMI 27.51 kg/m2 Estimated body mass index is 27.51 kg/(m^2) as calculated from the following:    Height as of this encounter: 6' 4\" (1.93 m).    Weight as of this encounter: 226 lb (102.5 kg).  Medications and allergies reviewed.    Kenia NAGEL CMA    "

## 2018-10-05 NOTE — LETTER
10/5/2018         RE: Tru Almanza  [address] [place] [address] [address]  [facility] [address]        Dear Colleague,    Thank you for referring your patient, Tru Almanza, to the [facility]. Please see a copy of my visit note below.    Tru returns to the office for reevaluation of the right foot.  The patient relates following the instructions given at the last visit with noted more pain.  The patient relates overall less  improvement in pain and function of the right foot.  The patient relates no other problems.    PAST MEDICAL HISTORY:   Past Medical History:   Diagnosis Date     Pericarditis        BMI= Body mass index is 27.51 kg/(m^2).    Weight management plan: Patient was referred to their PCP to discuss a diet and exercise plan.    Physical Exam:    General: The patient appears to have a pleasant mental affect.    Lower extremity physical exam:  Neurovascular status is intact with palpable pedal pulses and intact epicritic sensations.  Muscular exam is within normal limits to major muscle groups.  Integument is intact.      One notes decreased edema.  One notes pain on palpation under the right heel at the insertion point of the plantar fascia.  pain on palpation over the extensor tendons on the left ankle.  Noted pain with dorsiflexion against resistance.  No erythema noted.       Assessment:      ICD-10-CM    1. Plantar fasciitis, right M72.2 order for DME     ORTHOTICS REFERRAL     methylPREDNISolone (MEDROL DOSEPAK) 4 MG tablet   2. Left ankle tendinitis M77.52 order for DME     ORTHOTICS REFERRAL     methylPREDNISolone (MEDROL DOSEPAK) 4 MG tablet   3. Lymphedema of left leg I89.0 order for DME       Plan:  I have explained to Tru about the conditions.  At this time, the patient was instructed on icing, stretching, tissue massage and support.  The patient was referred to [facility] for custom orthotics that will aid in offloading the tension forces to  the soft tissues and prevent further inflammation.  The patient was prescribed compression stockings.   To reduce the amount of current inflammation, the patient was prescribed a medrol dospak to be taken daily with food and instructed to stop taking if any stomach irritation or swelling in extremities are noted.  The patient will return in four weeks for reevaluation if the symptoms do not resolve.        Disclaimer: This note consists of symbols derived from keyboarding, dictation and/or voice recognition software. As a result, there may be errors in the script that have gone undetected. Please consider this when interpreting information found in this chart.       SHONA Balbuena.P.M., F.ELLEN.C.F.A.S.      Again, thank you for allowing me to participate in the care of your patient.        Sincerely,        Yariel Chisholm DPM

## 2018-11-15 ENCOUNTER — HOSPITAL ENCOUNTER (OUTPATIENT)
Dept: MRI IMAGING | Facility: CLINIC | Age: 43
Discharge: HOME | End: 2018-11-15
Attending: PODIATRIST | Admitting: PODIATRIST
Payer: COMMERCIAL

## 2018-11-15 DIAGNOSIS — M77.52 BURSITIS OF LEFT FOOT: ICD-10-CM

## 2018-11-15 PROCEDURE — 73718 MRI LOWER EXTREMITY W/O DYE: CPT | Mod: LT

## 2018-11-30 ENCOUNTER — OFFICE VISIT (OUTPATIENT)
Dept: PODIATRY | Facility: CLINIC | Age: 43
End: 2018-11-30
Payer: COMMERCIAL

## 2018-11-30 VITALS — RESPIRATION RATE: 16 BRPM | HEIGHT: 76 IN | WEIGHT: 219.6 LBS | BODY MASS INDEX: 26.74 KG/M2

## 2018-11-30 DIAGNOSIS — T14.8XXA CONTUSION OF BONE: Primary | ICD-10-CM

## 2018-11-30 PROCEDURE — 99213 OFFICE O/P EST LOW 20 MIN: CPT | Performed by: PODIATRIST

## 2018-11-30 NOTE — MR AVS SNAPSHOT
"              After Visit Summary   11/30/2018    Tru Almanza    MRN: [id number]           Patient Information     Date Of Birth          1975        Visit Information        Provider Department      11/30/2018 8:00 AM Yariel Chisholm DPM [facility]        Today's Diagnoses     Contusion of bone    -  1      Care Instructions    Limited weightbearing with a Cam boot.          Follow-ups after your visit        Follow-up notes from your care team     Return in about 6 weeks (around 1/11/2019).      Who to contact     If you have questions or need follow up information about today's clinic visit or your schedule please contact [facility] directly at [phone].  Normal or non-critical lab and imaging results will be communicated to you by [employer]art, letter or phone within 4 business days after the clinic has received the results. If you do not hear from us within 7 days, please contact the clinic through [employer] or phone. If you have a critical or abnormal lab result, we will notify you by phone as soon as possible.  Submit refill requests through [employer] or call your pharmacy and they will forward the refill request to us. Please allow 3 business days for your refill to be completed.          Additional Information About Your Visit        MyChart Information     [employer] gives you secure access to your electronic health record. If you see a primary care provider, you can also send messages to your care team and make appointments. If you have questions, please call your primary care clinic.  If you do not have a primary care provider, please call [phone] and they will assist you.        Care EveryWhere ID     This is your Care EveryWhere ID. This could be used by other organizations to access your [place] medical records  [id number]        Your Vitals Were     Respirations Height BMI (Body Mass Index)             16 6' 4\" (1.93 m) 26.73 kg/m2          Blood " Pressure from Last 3 Encounters:   08/09/18 119/80   07/27/18 125/79   07/02/17 (!) 146/93    Weight from Last 3 Encounters:   11/30/18 219 lb 9.6 oz (99.6 kg)   10/05/18 226 lb (102.5 kg)   08/24/18 218 lb (98.9 kg)              Today, you had the following     No orders found for display       Primary Care Provider Office Phone # Fax #    Dori Avilez [phone] [address] [place] [address]        Equal Access to Services     [facility]: Hadii aad ku hadasho Soomaali, waaxda luqadaha, qaybta kaalmada adeegyada, adolfo garcia haycharlien adesangeeta muñiz . So [facility] [phone].    ATENCIÓN: Si habla español, tiene a carrillo disposición servicios gratuitos de asistencia lingüística. [facility] [phone].    We comply with applicable federal civil rights laws and [place] laws. We do not discriminate on the basis of race, color, national origin, age, disability, sex, sexual orientation, or gender identity.            Thank you!     Thank you for choosing [facility]  for your care. Our goal is always to provide you with excellent care. Hearing back from our patients is one way we can continue to improve our services. Please take a few minutes to complete the written survey that you may receive in the mail after your visit with us. Thank you!             Your Updated Medication List - Protect others around you: Learn how to safely use, store and throw away your medicines at www.disposemymeds.org.          This list is accurate as of 11/30/18 11:59 PM.  Always use your most recent med list.                   Brand Name Dispense Instructions for use Diagnosis    ADVIL PO           order for DME     1 Units    Equipment being ordered: Dynaflex insert    Bursitis of left foot       order for DME     2 Units    Equipment being ordered: Dynaflex insert    Bursitis of left foot, Plantar fasciitis, right       order for DME     1 Device    CAM walker - short    Left ankle  tendinitis       order for DME     1 Device    Trilok Ankle Brace    Left ankle tendinitis       order for DME     1 Units    Compression stockings    Lymphedema of left leg       order for DME     3 Units    20-30 mm Hg knee high Compression stockings    Left ankle tendinitis, Plantar fasciitis, right, Lymphedema of left leg

## 2018-11-30 NOTE — NURSING NOTE
"Chief Complaint   Patient presents with     RECHECK     go over MRI results for left foot       Initial Resp 16  Ht 6' 4\" (1.93 m)  Wt 219 lb 9.6 oz (99.6 kg)  BMI 26.73 kg/m2 Estimated body mass index is 26.73 kg/(m^2) as calculated from the following:    Height as of this encounter: 6' 4\" (1.93 m).    Weight as of this encounter: 219 lb 9.6 oz (99.6 kg).  Medications and allergies reviewed.    Kenia NAGEL CMA    "

## 2018-11-30 NOTE — PROGRESS NOTES
Tru returns to the office for reevaluation of the left foot.  The patient relates following the instructions given at the last visit with noted more pain.  The patient relates overall less  improvement in pain and function of the left foot.  The patient relates no other problems.    PAST MEDICAL HISTORY:   Past Medical History:   Diagnosis Date     Pericarditis        BMI= Body mass index is 26.73 kg/(m^2).    Weight management plan: Patient was referred to their PCP to discuss a diet and exercise plan.    Physical Exam:    General: The patient appears to have a pleasant mental affect.    Lower extremity physical exam:  Neurovascular status is intact with palpable pedal pulses and intact epicritic sensations.  Muscular exam is within normal limits to major muscle groups.  Integument is intact.      One notes decreased edema.  One notes pain on palpation over the second metatarsal cuneiform joint on the left      MRI LEFT FOOT WITHOUT CONTRAST   11/15/2018 9:04 AM     HISTORY: 9 months of left foot pain. Bursitis.     COMPARISON: Radiographs on 7/27/2018.     TECHNIQUE: Multiplanar MR imaging was performed through the left  midfoot without contrast. A marker was placed over the site of  clinical concern dorsal to the foot.      FINDINGS: There is extensive marrow edema throughout the majority of  the intermediate cuneiform and to a somewhat lesser degree, the  proximal half of the second metatarsal. A distinct focal fracture line  is not identified. There is mild marrow edema within the lateral  cuneiform. No other abnormal marrow signal intensity is demonstrated.  The joint spaces are well preserved. There is an approximately 1 cm  accessory navicular bone which does not demonstrate any abnormal  marrow signal intensity. No joint effusion is seen.     No tendon or ligament pathology is identified. The plantar fascia is  unremarkable. No abnormal soft tissue mass or fluid collection  is  seen.         IMPRESSION:  1. There is prominent marrow edema throughout the majority of the  intermediate cuneiform and to a lesser degree, the proximal half of  the second metatarsal. A distinct fracture line is not seen. These  areas are suspicious for stress fractures or stress reaction. Mild  marrow edema within the lateral cuneiform may represent a similar but  less extensive process.  2. No evidence of bursitis.      JENNY MONTES DE OCA MD  Assessment:      ICD-10-CM    1. Contusion of bone T14.8XXA     Middle cuneiform second metatarsal left foot       Plan:  I have explained to Tru about the conditions.  At this time, the patient will return to wearing his cam boot.  The patient was informed that if pain persists that he may require to be nonweightbearing on the left foot.  Patient will return in 6 weeks for reevaluation.    Disclaimer: This note consists of symbols derived from keyboarding, dictation and/or voice recognition software. As a result, there may be errors in the script that have gone undetected. Please consider this when interpreting information found in this chart.       MARYANA Chisholm D.P.M., F.ELLEN.C.F.A.S.

## 2019-04-16 ENCOUNTER — TELEPHONE (OUTPATIENT)
Dept: FAMILY MEDICINE | Facility: CLINIC | Age: 44
End: 2019-04-16

## 2019-04-16 ENCOUNTER — OFFICE VISIT (OUTPATIENT)
Dept: FAMILY MEDICINE | Facility: CLINIC | Age: 44
End: 2019-04-16
Payer: COMMERCIAL

## 2019-04-16 VITALS
RESPIRATION RATE: 16 BRPM | HEIGHT: 76 IN | WEIGHT: 228 LBS | OXYGEN SATURATION: 97 % | SYSTOLIC BLOOD PRESSURE: 122 MMHG | BODY MASS INDEX: 27.76 KG/M2 | HEART RATE: 74 BPM | TEMPERATURE: 97.9 F | DIASTOLIC BLOOD PRESSURE: 84 MMHG

## 2019-04-16 DIAGNOSIS — F33.1 MODERATE EPISODE OF RECURRENT MAJOR DEPRESSIVE DISORDER (H): Primary | ICD-10-CM

## 2019-04-16 DIAGNOSIS — F43.0 ACUTE REACTION TO STRESS: ICD-10-CM

## 2019-04-16 PROCEDURE — 99214 OFFICE O/P EST MOD 30 MIN: CPT | Performed by: FAMILY MEDICINE

## 2019-04-16 RX ORDER — BUPROPION HYDROCHLORIDE 150 MG/1
150 TABLET ORAL EVERY MORNING
Qty: 90 TABLET | Refills: 1 | Status: SHIPPED | OUTPATIENT
Start: 2019-04-16 | End: 2019-06-13

## 2019-04-16 ASSESSMENT — ANXIETY QUESTIONNAIRES
2. NOT BEING ABLE TO STOP OR CONTROL WORRYING: MORE THAN HALF THE DAYS
GAD7 TOTAL SCORE: 12
7. FEELING AFRAID AS IF SOMETHING AWFUL MIGHT HAPPEN: NOT AT ALL
5. BEING SO RESTLESS THAT IT IS HARD TO SIT STILL: SEVERAL DAYS
7. FEELING AFRAID AS IF SOMETHING AWFUL MIGHT HAPPEN: NOT AT ALL
1. FEELING NERVOUS, ANXIOUS, OR ON EDGE: NEARLY EVERY DAY
4. TROUBLE RELAXING: MORE THAN HALF THE DAYS
6. BECOMING EASILY ANNOYED OR IRRITABLE: MORE THAN HALF THE DAYS
GAD7 TOTAL SCORE: 12
GAD7 TOTAL SCORE: 12
3. WORRYING TOO MUCH ABOUT DIFFERENT THINGS: MORE THAN HALF THE DAYS

## 2019-04-16 ASSESSMENT — PATIENT HEALTH QUESTIONNAIRE - PHQ9
10. IF YOU CHECKED OFF ANY PROBLEMS, HOW DIFFICULT HAVE THESE PROBLEMS MADE IT FOR YOU TO DO YOUR WORK, TAKE CARE OF THINGS AT HOME, OR GET ALONG WITH OTHER PEOPLE: VERY DIFFICULT
SUM OF ALL RESPONSES TO PHQ QUESTIONS 1-9: 18
SUM OF ALL RESPONSES TO PHQ QUESTIONS 1-9: 18

## 2019-04-16 ASSESSMENT — MIFFLIN-ST. JEOR: SCORE: 2030.7

## 2019-04-16 NOTE — NURSING NOTE
"Chief Complaint   Patient presents with     Depression     alot of family issues       Initial /84   Pulse 74   Temp 97.9  F (36.6  C) (Tympanic)   Resp 16   Ht 1.93 m (6' 4\")   Wt 103.4 kg (228 lb)   SpO2 97%   BMI 27.75 kg/m   Estimated body mass index is 27.75 kg/m  as calculated from the following:    Height as of this encounter: 1.93 m (6' 4\").    Weight as of this encounter: 103.4 kg (228 lb).    Patient presents to the clinic using No DME    Health Maintenance that is potentially due pending provider review:  NONE    n/a    Is there anyone who you would like to be able to receive your results? No  If yes have patient fill out PRIYA    "
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
## 2019-04-16 NOTE — PATIENT INSTRUCTIONS
Restart the wellbutrin    Get some counseling for yourself    See you back in a month (or telephone call)

## 2019-04-17 ASSESSMENT — PATIENT HEALTH QUESTIONNAIRE - PHQ9: SUM OF ALL RESPONSES TO PHQ QUESTIONS 1-9: 18

## 2019-04-17 ASSESSMENT — ANXIETY QUESTIONNAIRES: GAD7 TOTAL SCORE: 12

## 2019-04-25 ENCOUNTER — TELEPHONE (OUTPATIENT)
Dept: FAMILY MEDICINE | Facility: CLINIC | Age: 44
End: 2019-04-25

## 2019-04-25 NOTE — TELEPHONE ENCOUNTER
[employer] -Disability Insurance -Behavioral Health Statement  Form placed on Provider Dr. Elena desk for Signature.  Pt is aware that Dr. Elena is not in until 5/2/19  [facility]

## 2019-05-03 NOTE — TELEPHONE ENCOUNTER
[facility] 5/2/19 x 3.  Spoke with pt 5/3/19 pt said his messages are not going through.  Pt said that he would like time off from 4/16/19 approximate date return would be 8 weeks 6/11/19. Pt does have appt scheduled with Dr. Elena 5/16/19.    Will contact us Monday 5/6/19 mid morning to see if we have further questions since he is having trouble with messages.  [facility]\

## 2019-05-06 NOTE — TELEPHONE ENCOUNTER
Form received back signed  5/6/19  Faxed Back & Sent to be scanned to this encounter  [facility]

## 2019-05-16 ENCOUNTER — OFFICE VISIT (OUTPATIENT)
Dept: FAMILY MEDICINE | Facility: CLINIC | Age: 44
End: 2019-05-16
Payer: COMMERCIAL

## 2019-05-16 VITALS
DIASTOLIC BLOOD PRESSURE: 80 MMHG | TEMPERATURE: 97.8 F | RESPIRATION RATE: 16 BRPM | OXYGEN SATURATION: 98 % | BODY MASS INDEX: 27.75 KG/M2 | SYSTOLIC BLOOD PRESSURE: 120 MMHG | WEIGHT: 228 LBS | HEART RATE: 67 BPM

## 2019-05-16 DIAGNOSIS — F43.0 ACUTE REACTION TO STRESS: ICD-10-CM

## 2019-05-16 DIAGNOSIS — F33.1 MODERATE EPISODE OF RECURRENT MAJOR DEPRESSIVE DISORDER (H): Primary | ICD-10-CM

## 2019-05-16 PROCEDURE — 99213 OFFICE O/P EST LOW 20 MIN: CPT | Performed by: FAMILY MEDICINE

## 2019-05-16 ASSESSMENT — ANXIETY QUESTIONNAIRES
3. WORRYING TOO MUCH ABOUT DIFFERENT THINGS: SEVERAL DAYS
GAD7 TOTAL SCORE: 8
7. FEELING AFRAID AS IF SOMETHING AWFUL MIGHT HAPPEN: NOT AT ALL
1. FEELING NERVOUS, ANXIOUS, OR ON EDGE: MORE THAN HALF THE DAYS
5. BEING SO RESTLESS THAT IT IS HARD TO SIT STILL: NOT AT ALL
7. FEELING AFRAID AS IF SOMETHING AWFUL MIGHT HAPPEN: NOT AT ALL
4. TROUBLE RELAXING: MORE THAN HALF THE DAYS
2. NOT BEING ABLE TO STOP OR CONTROL WORRYING: SEVERAL DAYS
6. BECOMING EASILY ANNOYED OR IRRITABLE: MORE THAN HALF THE DAYS
GAD7 TOTAL SCORE: 8
GAD7 TOTAL SCORE: 8

## 2019-05-16 ASSESSMENT — PATIENT HEALTH QUESTIONNAIRE - PHQ9
SUM OF ALL RESPONSES TO PHQ QUESTIONS 1-9: 6
10. IF YOU CHECKED OFF ANY PROBLEMS, HOW DIFFICULT HAVE THESE PROBLEMS MADE IT FOR YOU TO DO YOUR WORK, TAKE CARE OF THINGS AT HOME, OR GET ALONG WITH OTHER PEOPLE: SOMEWHAT DIFFICULT
SUM OF ALL RESPONSES TO PHQ QUESTIONS 1-9: 6

## 2019-05-16 NOTE — PATIENT INSTRUCTIONS
Stay on same dose  mychart me if you want to add something or go up on dose  Get counseling  See you back in a month

## 2019-05-16 NOTE — NURSING NOTE
"Chief Complaint   Patient presents with     Depression       Initial There were no vitals taken for this visit. Estimated body mass index is 27.75 kg/m  as calculated from the following:    Height as of 4/16/19: 1.93 m (6' 4\").    Weight as of 4/16/19: 103.4 kg (228 lb).    Patient presents to the clinic using No DME    Health Maintenance that is potentially due pending provider review:  NONE    n/a    Is there anyone who you would like to be able to receive your results? No  If yes have patient fill out PRIYA    "

## 2019-05-16 NOTE — PROGRESS NOTES
SUBJECTIVE:   Tru Almanza is a 43 year old male who presents to clinic today for the following   health issues:      Depression and Anxiety Follow-Up    Status since last visit: improved anxiety - but more irritable    Other associated symptoms:None    Complicating factors:     Significant life event: Yes-  Building a house and kids     Current substance abuse: None    PHQ 9/14/2016 4/16/2019 5/16/2019   PHQ-9 Total Score 4 18 6   Q9: Thoughts of better off dead/self-harm past 2 weeks Not at all Not at all Not at all     ADRY-7 SCORE 9/14/2016 4/16/2019 5/16/2019   Total Score - - -   Total Score - 12 (moderate anxiety) 8 (mild anxiety)   Total Score 9 12 8        PHQ-9  English  PHQ-9   Any Language  ADRY-7  Suicide Assessment Five-step Evaluation and Treatment (SAFE-T)    Amount of exercise or physical activity: None    Problems taking medications regularly: No    Medication side effects: none    Diet: regular (no restrictions)    Answers for HPI/ROS submitted by the patient on 5/16/2019   If you checked off any problems, how difficult have these problems made it for you to do your work, take care of things at home, or get along with other people?: Somewhat difficult  PHQ9 TOTAL SCORE: 6  ADRY 7 TOTAL SCORE: 8  PHQ-9 ([employer]) 5/16/2019   Total Score    1.  Little interest or pleasure in doing things 1   2.  Feeling down, depressed, or hopeless 1   3.  Trouble falling or staying asleep, or sleeping too much 0   4.  Feeling tired or having little energy 1   5.  Poor appetite or overeating 0   6.  Feeling bad about yourself 1   7.  Trouble concentrating 1   8.  Moving slowly or restless 1   9.  Suicidal or self-harm thoughts 0   PHQ-9 Total Score 6   1.  Little interest or pleasure in doing things Several days   2.  Feeling down, depressed, or hopeless Several days   3.  Trouble falling or staying asleep, or sleeping too much Not at all   4.  Feeling tired or having little energy Several days   5.  Poor  appetite or overeating Not at all   6.  Feeling bad about yourself Several days   7.  Trouble concentrating Several days   8.  Moving slowly or restless Several days   9.  Suicidal or self-harm thoughts Not at all   PHQ-9 via [employer] TOTAL SCORE-----> 6 (Mild depression)   Difficulty at work, home, or with people Somewhat difficult     His anxiety and depression are better, but is more irritable. No medical side effects. Did stop coffee recentlly   That may be from life in general. He had tried 300 mg dose in past and didn't feel well on it.   He would like to keep on same dose for the next few weeks.  He is off work until June 16.   He has not gotten personal counseling yet. Kids are getting counseling and family together.     Additional history: as documented    Reviewed  and updated as needed this visit by clinical staff  Tobacco  Allergies  Meds  Problems  Med Hx  Surg Hx  Fam Hx  Soc Hx          Reviewed and updated as needed this visit by Provider  Tobacco  Allergies  Meds  Problems  Med Hx  Surg Hx  Fam Hx         BP Readings from Last 3 Encounters:   05/16/19 120/80   04/16/19 122/84   08/09/18 119/80    Wt Readings from Last 3 Encounters:   05/16/19 103.4 kg (228 lb)   04/16/19 103.4 kg (228 lb)   11/30/18 99.6 kg (219 lb 9.6 oz)             OBJECTIVE: /80 (BP Location: Right arm)   Pulse 67   Temp 97.8  F (36.6  C) (Tympanic)   Resp 16   Wt 103.4 kg (228 lb)   SpO2 98%   BMI 27.75 kg/m     General: appears well, no distress  Psych: Mood:euthymic,  Affect: appropriate, Speech:normal, Thought Processes: normal, Suicidal Ideation: No       ASSESSMENT:  1. Moderate episode of recurrent major depressive disorder (H)    2. Acute reaction to stress        PLAN:  See below    Patient Instructions   Stay on same dose  mychart me if you want to add something or go up on dose  Get counseling  See you back in a month       Dori Elena MD     Answers for HPI/ROS submitted by the patient  on 5/16/2019   If you checked off any problems, how difficult have these problems made it for you to do your work, take care of things at home, or get along with other people?: Somewhat difficult  PHQ9 TOTAL SCORE: 6  ADRY 7 TOTAL SCORE: 8

## 2019-05-17 ASSESSMENT — PATIENT HEALTH QUESTIONNAIRE - PHQ9: SUM OF ALL RESPONSES TO PHQ QUESTIONS 1-9: 6

## 2019-05-17 ASSESSMENT — ANXIETY QUESTIONNAIRES: GAD7 TOTAL SCORE: 8

## 2019-06-13 ENCOUNTER — OFFICE VISIT (OUTPATIENT)
Dept: FAMILY MEDICINE | Facility: CLINIC | Age: 44
End: 2019-06-13
Payer: COMMERCIAL

## 2019-06-13 VITALS
RESPIRATION RATE: 16 BRPM | WEIGHT: 219 LBS | TEMPERATURE: 97.6 F | DIASTOLIC BLOOD PRESSURE: 78 MMHG | HEART RATE: 60 BPM | OXYGEN SATURATION: 98 % | SYSTOLIC BLOOD PRESSURE: 110 MMHG | HEIGHT: 76 IN | BODY MASS INDEX: 26.67 KG/M2

## 2019-06-13 DIAGNOSIS — F33.1 MODERATE EPISODE OF RECURRENT MAJOR DEPRESSIVE DISORDER (H): Primary | ICD-10-CM

## 2019-06-13 DIAGNOSIS — F43.0 ACUTE REACTION TO STRESS: ICD-10-CM

## 2019-06-13 PROCEDURE — 99213 OFFICE O/P EST LOW 20 MIN: CPT | Performed by: FAMILY MEDICINE

## 2019-06-13 RX ORDER — BUPROPION HYDROCHLORIDE 150 MG/1
150 TABLET ORAL EVERY MORNING
Qty: 90 TABLET | Refills: 0 | Status: SHIPPED | OUTPATIENT
Start: 2019-06-13 | End: 2019-11-11

## 2019-06-13 ASSESSMENT — ANXIETY QUESTIONNAIRES
GAD7 TOTAL SCORE: 3
6. BECOMING EASILY ANNOYED OR IRRITABLE: SEVERAL DAYS
1. FEELING NERVOUS, ANXIOUS, OR ON EDGE: SEVERAL DAYS
7. FEELING AFRAID AS IF SOMETHING AWFUL MIGHT HAPPEN: NOT AT ALL
5. BEING SO RESTLESS THAT IT IS HARD TO SIT STILL: NOT AT ALL
3. WORRYING TOO MUCH ABOUT DIFFERENT THINGS: NOT AT ALL
2. NOT BEING ABLE TO STOP OR CONTROL WORRYING: NOT AT ALL

## 2019-06-13 ASSESSMENT — PATIENT HEALTH QUESTIONNAIRE - PHQ9
5. POOR APPETITE OR OVEREATING: SEVERAL DAYS
SUM OF ALL RESPONSES TO PHQ QUESTIONS 1-9: 3

## 2019-06-13 ASSESSMENT — MIFFLIN-ST. JEOR: SCORE: 1989.88

## 2019-06-13 ASSESSMENT — PAIN SCALES - GENERAL: PAINLEVEL: MILD PAIN (2)
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
## 2019-06-14 ASSESSMENT — ANXIETY QUESTIONNAIRES: GAD7 TOTAL SCORE: 3

## 2019-07-09 ENCOUNTER — OFFICE VISIT (OUTPATIENT)
Dept: FAMILY MEDICINE | Facility: CLINIC | Age: 44
End: 2019-07-09
Payer: COMMERCIAL

## 2019-07-09 VITALS
SYSTOLIC BLOOD PRESSURE: 104 MMHG | TEMPERATURE: 97.5 F | WEIGHT: 214 LBS | OXYGEN SATURATION: 99 % | RESPIRATION RATE: 16 BRPM | DIASTOLIC BLOOD PRESSURE: 70 MMHG | BODY MASS INDEX: 26.06 KG/M2 | HEIGHT: 76 IN | HEART RATE: 68 BPM

## 2019-07-09 DIAGNOSIS — F43.0 ACUTE REACTION TO STRESS: ICD-10-CM

## 2019-07-09 DIAGNOSIS — F33.1 MODERATE EPISODE OF RECURRENT MAJOR DEPRESSIVE DISORDER (H): Primary | ICD-10-CM

## 2019-07-09 PROCEDURE — 99213 OFFICE O/P EST LOW 20 MIN: CPT | Performed by: FAMILY MEDICINE

## 2019-07-09 ASSESSMENT — ANXIETY QUESTIONNAIRES
GAD7 TOTAL SCORE: 3
GAD7 TOTAL SCORE: 3
7. FEELING AFRAID AS IF SOMETHING AWFUL MIGHT HAPPEN: NOT AT ALL
2. NOT BEING ABLE TO STOP OR CONTROL WORRYING: NOT AT ALL
4. TROUBLE RELAXING: NOT AT ALL
7. FEELING AFRAID AS IF SOMETHING AWFUL MIGHT HAPPEN: NOT AT ALL
GAD7 TOTAL SCORE: 3
5. BEING SO RESTLESS THAT IT IS HARD TO SIT STILL: NOT AT ALL
1. FEELING NERVOUS, ANXIOUS, OR ON EDGE: SEVERAL DAYS
6. BECOMING EASILY ANNOYED OR IRRITABLE: SEVERAL DAYS
3. WORRYING TOO MUCH ABOUT DIFFERENT THINGS: SEVERAL DAYS

## 2019-07-09 ASSESSMENT — PATIENT HEALTH QUESTIONNAIRE - PHQ9
SUM OF ALL RESPONSES TO PHQ QUESTIONS 1-9: 3
SUM OF ALL RESPONSES TO PHQ QUESTIONS 1-9: 3
10. IF YOU CHECKED OFF ANY PROBLEMS, HOW DIFFICULT HAVE THESE PROBLEMS MADE IT FOR YOU TO DO YOUR WORK, TAKE CARE OF THINGS AT HOME, OR GET ALONG WITH OTHER PEOPLE: SOMEWHAT DIFFICULT

## 2019-07-09 ASSESSMENT — MIFFLIN-ST. JEOR: SCORE: 1967.2

## 2019-07-09 NOTE — NURSING NOTE
"Chief Complaint   Patient presents with     Anxiety     recheck       Initial /70 (BP Location: Right arm)   Pulse 68   Temp 97.5  F (36.4  C) (Tympanic)   Resp 16   Ht 1.93 m (6' 4\")   Wt 97.1 kg (214 lb)   SpO2 99%   BMI 26.05 kg/m   Estimated body mass index is 26.05 kg/m  as calculated from the following:    Height as of this encounter: 1.93 m (6' 4\").    Weight as of this encounter: 97.1 kg (214 lb).    Patient presents to the clinic using No DME    Health Maintenance that is potentially due pending provider review:  NONE    n/a    Is there anyone who you would like to be able to receive your results? No  If yes have patient fill out PRIYA    "

## 2019-07-09 NOTE — PROGRESS NOTES
Subjective     Tru Almanza is a 43 year old male who presents to clinic today for the following health issues:    HPI   Depression and Anxiety Follow-Up    How are you doing with your depression since your last visit? Improved     How are you doing with your anxiety since your last visit?  Improved     Are you having other symptoms that might be associated with depression or anxiety? No    Have you had a significant life event? Housing Concerns     Do you have any concerns with your use of alcohol or other drugs? No    Social History     Tobacco Use     Smoking status: Never Smoker     Smokeless tobacco: Never Used   Substance Use Topics     Alcohol use: No     Drug use: No     PHQ 5/16/2019 6/13/2019 7/9/2019   PHQ-9 Total Score 6 3 3   Q9: Thoughts of better off dead/self-harm past 2 weeks Not at all Not at all Not at all     ADRY-7 SCORE 5/16/2019 6/13/2019 7/9/2019   Total Score - - -   Total Score 8 (mild anxiety) - 3 (minimal anxiety)   Total Score 8 3 3     Answers for HPI/ROS submitted by the patient on 7/9/2019   If you checked off any problems, how difficult have these problems made it for you to do your work, take care of things at home, or get along with other people?: Somewhat difficult  PHQ9 TOTAL SCORE: 3  ADRY 7 TOTAL SCORE: 3  PHQ-9 ([employer]) 7/9/2019   1.  Little interest or pleasure in doing things 0   2.  Feeling down, depressed, or hopeless 1   3.  Trouble falling or staying asleep, or sleeping too much 2   4.  Feeling tired or having little energy 0   5.  Poor appetite or overeating 0   6.  Feeling bad about yourself 0   7.  Trouble concentrating 0   8.  Moving slowly or restless 0   9.  Suicidal or self-harm thoughts 0   PHQ-9 Total Score 3   1.  Little interest or pleasure in doing things Not at all   2.  Feeling down, depressed, or hopeless Several days   3.  Trouble falling or staying asleep, or sleeping too much More than half the days   4.  Feeling tired or having little energy Not at  "all   5.  Poor appetite or overeating Not at all   6.  Feeling bad about yourself Not at all   7.  Trouble concentrating Not at all   8.  Moving slowly or restless Not at all   9.  Suicidal or self-harm thoughts Not at all   [facility] TOTAL SC[facility]-----> 3 (Minimal depression)   Difficulty at work, home, or with people Somewhat difficult     Has been getting counseling once a week, has been helpful  On wellbutrin    Suicide Assessment Five-step Evaluation and Treatment (SAFE-T)    Amount of exercise or physical activity: None    Problems taking medications regularly: No    Medication side effects: none    Diet: regular (no restrictions)        BP Readings from Last 3 Encounters:   07/09/19 104/70   06/13/19 110/78   05/16/19 120/80    Wt Readings from Last 3 Encounters:   07/09/19 97.1 kg (214 lb)   06/13/19 99.3 kg (219 lb)   05/16/19 103.4 kg (228 lb)              Reviewed and updated as needed this visit by Provider             Objective    /70 (BP Location: Right arm)   Pulse 68   Temp 97.5  F (36.4  C) (Tympanic)   Resp 16   Ht 1.93 m (6' 4\")   Wt 97.1 kg (214 lb)   SpO2 99%   BMI 26.05 kg/m    Body mass index is 26.05 kg/m .  Physical Exam   General: appears well, in no distress   Psych: Mood:euthymic,  Affect: appropriate, Speech:normal, Thought Processes: normal, Suicidal Ideation: No        ASSESSMENT:  1. Moderate episode of recurrent major depressive disorder (H)    2. Acute reaction to stress        PLAN:  Return to work filled out.  See below    Patient Instructions   Back to work    See you in 6 months or as needed       Dori Elena MD       "

## 2019-07-10 ASSESSMENT — ANXIETY QUESTIONNAIRES: GAD7 TOTAL SCORE: 3

## 2019-07-10 ASSESSMENT — PATIENT HEALTH QUESTIONNAIRE - PHQ9: SUM OF ALL RESPONSES TO PHQ QUESTIONS 1-9: 3

## 2019-09-12 ENCOUNTER — HOSPITAL ENCOUNTER (EMERGENCY)
Facility: CLINIC | Age: 44
Discharge: HOME | End: 2019-09-12
Attending: FAMILY MEDICINE | Admitting: FAMILY MEDICINE
Payer: COMMERCIAL

## 2019-09-12 VITALS
DIASTOLIC BLOOD PRESSURE: 86 MMHG | HEART RATE: 66 BPM | SYSTOLIC BLOOD PRESSURE: 131 MMHG | TEMPERATURE: 98.2 F | WEIGHT: 210 LBS | RESPIRATION RATE: 16 BRPM | BODY MASS INDEX: 25.56 KG/M2 | OXYGEN SATURATION: 100 %
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
## 2019-09-12 DIAGNOSIS — S29.012A UPPER BACK STRAIN, INITIAL ENCOUNTER: ICD-10-CM

## 2019-09-12 PROCEDURE — 99284 EMERGENCY DEPT VISIT MOD MDM: CPT | Mod: Z6 | Performed by: FAMILY MEDICINE

## 2019-09-12 PROCEDURE — 99283 EMERGENCY DEPT VISIT LOW MDM: CPT

## 2019-09-12 NOTE — DISCHARGE INSTRUCTIONS
Return to the Emergency Room if the following occurs:     Severely worsened pain, trouble with breathing, or for any concern at anytime.    Or, follow-up with the following provider as we discussed:     Return to your primary doctor as needed, or if not improved over the next 7 days.    Medications discussed:    Ibuprofen 600 mg every six hours for pain (7 days duration).  Tylenol 1000 mg every six hours for pain (7 days duration).  Therefore, you can alternate these every three hours and do it safely.    If you received pain-relieving or sedating medication during your time in the ER, avoid alcohol, driving automobiles, or working with machinery.  Also, a responsible adult must stay with you.        Call the Nurse Advice Line at [phone] or [phone] for any concern at anytime.

## 2019-09-12 NOTE — ED AVS SNAPSHOT
[facility] Emergency Department  [address] [address]  Phone:  [phone]  Fax:  [phone]                                    Tru Almanza   MRN: [id number]    Department:  [facility] Emergency Department   Date of Visit:  9/12/2019           After Visit Summary Signature Page    I have received my discharge instructions, and my questions have been answered. I have discussed any challenges I see with this plan with the nurse or doctor.    ..........................................................................................................................................  Patient/Patient Representative Signature      ..........................................................................................................................................  Patient Representative Print Name and Relationship to Patient    ..................................................               ................................................  Date                                   Time    ..........................................................................................................................................  Reviewed by Signature/Title    ...................................................              ..............................................  Date                                               Time          22EPIC Rev 08/18
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
## 2019-11-02 ENCOUNTER — HEALTH MAINTENANCE LETTER (OUTPATIENT)
Age: 44
End: 2019-11-02

## 2019-11-11 ENCOUNTER — APPOINTMENT (OUTPATIENT)
Dept: GENERAL RADIOLOGY | Facility: CLINIC | Age: 44
End: 2019-11-11
Attending: STUDENT IN AN ORGANIZED HEALTH CARE EDUCATION/TRAINING PROGRAM
Payer: COMMERCIAL

## 2019-11-11 ENCOUNTER — HOSPITAL ENCOUNTER (EMERGENCY)
Facility: CLINIC | Age: 44
Discharge: HOME | End: 2019-11-11
Attending: STUDENT IN AN ORGANIZED HEALTH CARE EDUCATION/TRAINING PROGRAM | Admitting: STUDENT IN AN ORGANIZED HEALTH CARE EDUCATION/TRAINING PROGRAM
Payer: COMMERCIAL

## 2019-11-11 VITALS
HEIGHT: 76 IN | RESPIRATION RATE: 12 BRPM | TEMPERATURE: 98 F | WEIGHT: 220 LBS | BODY MASS INDEX: 26.79 KG/M2 | SYSTOLIC BLOOD PRESSURE: 139 MMHG | OXYGEN SATURATION: 99 % | HEART RATE: 60 BPM | DIASTOLIC BLOOD PRESSURE: 91 MMHG

## 2019-11-11 DIAGNOSIS — R07.89 CHEST DISCOMFORT: ICD-10-CM

## 2019-11-11 LAB
ALBUMIN SERPL-MCNC: 3.6 G/DL (ref 3.4–5)
ALP SERPL-CCNC: 57 U/L (ref 40–150)
ALT SERPL W P-5'-P-CCNC: 19 U/L (ref 0–70)
ANION GAP SERPL CALCULATED.3IONS-SCNC: 2 MMOL/L (ref 3–14)
AST SERPL W P-5'-P-CCNC: 11 U/L (ref 0–45)
BASOPHILS # BLD AUTO: 0.1 10E9/L (ref 0–0.2)
BASOPHILS NFR BLD AUTO: 0.5 %
BILIRUB SERPL-MCNC: 0.3 MG/DL (ref 0.2–1.3)
BUN SERPL-MCNC: 19 MG/DL (ref 7–30)
CALCIUM SERPL-MCNC: 9.2 MG/DL (ref 8.5–10.1)
CHLORIDE SERPL-SCNC: 110 MMOL/L (ref 94–109)
CO2 SERPL-SCNC: 30 MMOL/L (ref 20–32)
CREAT SERPL-MCNC: 0.84 MG/DL (ref 0.66–1.25)
D DIMER PPP FEU-MCNC: 0.3 UG/ML FEU (ref 0–0.5)
DIFFERENTIAL METHOD BLD: NORMAL
EOSINOPHIL # BLD AUTO: 0.1 10E9/L (ref 0–0.7)
EOSINOPHIL NFR BLD AUTO: 1 %
ERYTHROCYTE [DISTWIDTH] IN BLOOD BY AUTOMATED COUNT: 12.9 % (ref 10–15)
GFR SERPL CREATININE-BSD FRML MDRD: >90 ML/MIN/{1.73_M2}
GLUCOSE SERPL-MCNC: 87 MG/DL (ref 70–99)
HCT VFR BLD AUTO: 44.5 % (ref 40–53)
HGB BLD-MCNC: 14.8 G/DL (ref 13.3–17.7)
IMM GRANULOCYTES # BLD: 0 10E9/L (ref 0–0.4)
IMM GRANULOCYTES NFR BLD: 0.3 %
LYMPHOCYTES # BLD AUTO: 3.3 10E9/L (ref 0.8–5.3)
LYMPHOCYTES NFR BLD AUTO: 33.7 %
MCH RBC QN AUTO: 29.3 PG (ref 26.5–33)
MCHC RBC AUTO-ENTMCNC: 33.3 G/DL (ref 31.5–36.5)
MCV RBC AUTO: 88 FL (ref 78–100)
MONOCYTES # BLD AUTO: 1 10E9/L (ref 0–1.3)
MONOCYTES NFR BLD AUTO: 10 %
NEUTROPHILS # BLD AUTO: 5.4 10E9/L (ref 1.6–8.3)
NEUTROPHILS NFR BLD AUTO: 54.5 %
NRBC # BLD AUTO: 0 10*3/UL
NRBC BLD AUTO-RTO: 0 /100
PLATELET # BLD AUTO: 311 10E9/L (ref 150–450)
POTASSIUM SERPL-SCNC: 4 MMOL/L (ref 3.4–5.3)
PROT SERPL-MCNC: 6.9 G/DL (ref 6.8–8.8)
RBC # BLD AUTO: 5.05 10E12/L (ref 4.4–5.9)
SODIUM SERPL-SCNC: 142 MMOL/L (ref 133–144)
TROPONIN I SERPL-MCNC: <0.015 UG/L (ref 0–0.04)
WBC # BLD AUTO: 9.8 10E9/L (ref 4–11)

## 2019-11-11 PROCEDURE — 99285 EMERGENCY DEPT VISIT HI MDM: CPT | Mod: 25 | Performed by: STUDENT IN AN ORGANIZED HEALTH CARE EDUCATION/TRAINING PROGRAM

## 2019-11-11 PROCEDURE — 96374 THER/PROPH/DIAG INJ IV PUSH: CPT

## 2019-11-11 PROCEDURE — 25000128 H RX IP 250 OP 636: Performed by: STUDENT IN AN ORGANIZED HEALTH CARE EDUCATION/TRAINING PROGRAM

## 2019-11-11 PROCEDURE — 25000132 ZZH RX MED GY IP 250 OP 250 PS 637: Performed by: STUDENT IN AN ORGANIZED HEALTH CARE EDUCATION/TRAINING PROGRAM

## 2019-11-11 PROCEDURE — 93005 ELECTROCARDIOGRAM TRACING: CPT

## 2019-11-11 PROCEDURE — 84484 ASSAY OF TROPONIN QUANT: CPT | Performed by: STUDENT IN AN ORGANIZED HEALTH CARE EDUCATION/TRAINING PROGRAM

## 2019-11-11 PROCEDURE — 85025 COMPLETE CBC W/AUTO DIFF WBC: CPT | Performed by: STUDENT IN AN ORGANIZED HEALTH CARE EDUCATION/TRAINING PROGRAM

## 2019-11-11 PROCEDURE — 93010 ELECTROCARDIOGRAM REPORT: CPT | Mod: Z6 | Performed by: STUDENT IN AN ORGANIZED HEALTH CARE EDUCATION/TRAINING PROGRAM

## 2019-11-11 PROCEDURE — 71046 X-RAY EXAM CHEST 2 VIEWS: CPT

## 2019-11-11 PROCEDURE — 99285 EMERGENCY DEPT VISIT HI MDM: CPT | Mod: 25

## 2019-11-11 PROCEDURE — 85379 FIBRIN DEGRADATION QUANT: CPT | Performed by: STUDENT IN AN ORGANIZED HEALTH CARE EDUCATION/TRAINING PROGRAM

## 2019-11-11 PROCEDURE — 80053 COMPREHEN METABOLIC PANEL: CPT | Performed by: STUDENT IN AN ORGANIZED HEALTH CARE EDUCATION/TRAINING PROGRAM

## 2019-11-11 RX ORDER — KETOROLAC TROMETHAMINE 15 MG/ML
10 INJECTION, SOLUTION INTRAMUSCULAR; INTRAVENOUS ONCE
Status: COMPLETED | OUTPATIENT
Start: 2019-11-11 | End: 2019-11-11

## 2019-11-11 RX ORDER — ASPIRIN 81 MG/1
162 TABLET, CHEWABLE ORAL ONCE
Status: COMPLETED | OUTPATIENT
Start: 2019-11-11 | End: 2019-11-11

## 2019-11-11 RX ADMIN — KETOROLAC TROMETHAMINE 10 MG: 15 INJECTION, SOLUTION INTRAMUSCULAR; INTRAVENOUS at 20:11

## 2019-11-11 RX ADMIN — ASPIRIN 81 MG 162 MG: 81 TABLET ORAL at 19:15

## 2019-11-11 ASSESSMENT — MIFFLIN-ST. JEOR: SCORE: 1994.41

## 2019-11-11 NOTE — ED AVS SNAPSHOT
[facility]  [address] [address]  Phone:  [phone]  Fax:  [phone]                                    Tru Almanza   MRN: [id number]    Department:  [facility]   Date of Visit:  11/11/2019           After Visit Summary Signature Page    I have received my discharge instructions, and my questions have been answered. I have discussed any challenges I see with this plan with the nurse or doctor.    ..........................................................................................................................................  Patient/Patient Representative Signature      ..........................................................................................................................................  Patient Representative Print Name and Relationship to Patient    ..................................................               ................................................  Date                                   Time    ..........................................................................................................................................  Reviewed by Signature/Title    ...................................................              ..............................................  Date                                               Time          22EPIC Rev 08/18

## 2019-11-12 NOTE — ED PROVIDER NOTES
History     Chief Complaint   Patient presents with     Chest Pain     rt side chest pain radiating to the back     HPI  Tru Almanza is a 43 year old male with documented history of pericarditis with small troponin rise concurrent with strep pharyngitis occurring in 2006 presents to the department for evaluation of right-sided chest discomfort.  Patient describes achy pleuritic right-sided chest discomfort radiating to the right scapular region beginning yesterday afternoon.  The pain is only mild but exacerbated with deep inspiration.  He denies recent fever, chills, chest pressure, neck pain, cough, hemoptysis, leg swelling, or shortness of breath.  No known history of CAD and denies exertional symptoms.    Allergies:  Allergies   Allergen Reactions     Erythromycin      At 2 years old       Problem List:    Patient Active Problem List    Diagnosis Date Noted     Moderate episode of recurrent major depressive disorder (H) 07/09/2019     Priority: Medium     Acute reaction to stress 07/09/2019     Priority: Medium     Persistent disorder of initiating or maintaining sleep 10/11/2016     Priority: Medium     Pericarditis 09/14/2016     Priority: Medium     9/14/16 Reports past history when he was young       Major depression, recurrent (H) 12/16/2013     Priority: Medium        Past Medical History:    Past Medical History:   Diagnosis Date     Pericarditis        Past Surgical History:    Past Surgical History:   Procedure Laterality Date     VASECTOMY  12/12/2011    Procedure:VASECTOMY; Surgical Exploration of the Scrotum,Excision of Vas Deferens Vasectomy Clip,Vasectomy; Surgeon:ALFONSO MCGOVERN; Location:[place] OR       Family History:    Family History   Problem Relation Age of Onset     Thyroid Disease Mother      Hypertension Father      Breast Cancer Maternal Grandmother        Social History:  Marital Status:   [2]  Social History     Tobacco Use     Smoking status: Never Smoker     Smokeless  "tobacco: Never Used   Substance Use Topics     Alcohol use: No     Drug use: No        Medications:    Ibuprofen (ADVIL PO)  order for DME  order for DME  order for DME  order for DME          Review of Systems  Constitutional:  Negative for fever or recent illness.  HENT: Negative for sore throat or nasal congestion  Cardiovascular: Positive for achy right-sided chest pain radiating towards back.  Respiratory:  Negative for cough or shortness of breath.  Gastrointestinal:  Negative for abdominal pain, vomiting, or diarrhea.    Musculoskeletal: Positive for mild achy right-sided scapular back pain radiating from right side of chest.  His recent injury or change in activity.  Neurological:  Negative for headache or dizziness.    All others reviewed and are negative.      Physical Exam   BP: (!) 140/83  Pulse: 67  Heart Rate: 59  Temp: 98  F (36.7  C)  Resp: 12  Height: 193 cm (6' 4\")  Weight: 99.8 kg (220 lb)  SpO2: 99 %      Physical Exam  Constitutional:  Well developed, well nourished.  Appears nontoxic and in no acute distress.    HENT:  Normocephalic and atraumatic.  Symmetric in appearance.  Eyes:  Conjunctivae are normal.  Neck:  Neck supple.  Cardiovascular:  No cyanosis.  RRR.  No audible murmurs noted.  No lower extremity edema or asymmetry.   Respiratory:  Effort normal without sign of respiratory distress.  CTAB without diminished regions.    Gastrointestinal:  Soft nondistended abdomen.  Nontender and without guarding.  No rigidity or rebound tenderness.   Musculoskeletal:  Moves extremities spontaneously.  Neurological:  Patient is alert.  Skin:  Skin is warm and dry.  Psychiatric:  Normal mood and affect.      ED Course        Procedures                 EKG Interpretation:      Interpreted by: Lonnie Campos  Time reviewed: Upon completion    Symptoms at time of EKG: Chest discomfort    Rhythm: Sinus  Rate: Normal  Axis: Normal   Conduction: RBBB  ST Segments/ T Waves: No pathologic ST-elevations or " T-wave abnormalities.  Q Waves: None  Comparison to prior: Similar morphology to previous     Clinical Impression: No sign of ischemia         Critical Care time:  none               Results for orders placed or performed during the hospital encounter of 11/11/19 (from the past 24 hour(s))   CBC with platelets differential   Result Value Ref Range    WBC 9.8 4.0 - 11.0 10e9/L    RBC Count 5.05 4.4 - 5.9 10e12/L    Hemoglobin 14.8 13.3 - 17.7 g/dL    Hematocrit 44.5 40.0 - 53.0 %    MCV 88 78 - 100 fl    MCH 29.3 26.5 - 33.0 pg    MCHC 33.3 31.5 - 36.5 g/dL    RDW 12.9 10.0 - 15.0 %    Platelet Count 311 150 - 450 10e9/L    Diff Method Automated Method     % Neutrophils 54.5 %    % Lymphocytes 33.7 %    % Monocytes 10.0 %    % Eosinophils 1.0 %    % Basophils 0.5 %    % Immature Granulocytes 0.3 %    Nucleated RBCs 0 0 /100    Absolute Neutrophil 5.4 1.6 - 8.3 10e9/L    Absolute Lymphocytes 3.3 0.8 - 5.3 10e9/L    Absolute Monocytes 1.0 0.0 - 1.3 10e9/L    Absolute Eosinophils 0.1 0.0 - 0.7 10e9/L    Absolute Basophils 0.1 0.0 - 0.2 10e9/L    Abs Immature Granulocytes 0.0 0 - 0.4 10e9/L    Absolute Nucleated RBC 0.0    Comprehensive metabolic panel   Result Value Ref Range    Sodium 142 133 - 144 mmol/L    Potassium 4.0 3.4 - 5.3 mmol/L    Chloride 110 (H) 94 - 109 mmol/L    Carbon Dioxide 30 20 - 32 mmol/L    Anion Gap 2 (L) 3 - 14 mmol/L    Glucose 87 70 - 99 mg/dL    Urea Nitrogen 19 7 - 30 mg/dL    Creatinine 0.84 0.66 - 1.25 mg/dL    GFR Estimate >90 >60 mL/min/[1.73_m2]    GFR Estimate If Black >90 >60 mL/min/[1.73_m2]    Calcium 9.2 8.5 - 10.1 mg/dL    Bilirubin Total 0.3 0.2 - 1.3 mg/dL    Albumin 3.6 3.4 - 5.0 g/dL    Protein Total 6.9 6.8 - 8.8 g/dL    Alkaline Phosphatase 57 40 - 150 U/L    ALT 19 0 - 70 U/L    AST 11 0 - 45 U/L   Troponin I   Result Value Ref Range    Troponin I ES <0.015 0.000 - 0.045 ug/L   D dimer quantitative   Result Value Ref Range    D Dimer 0.3 0.0 - 0.50 ug/ml FEU   XR Chest 2  Views    Narrative    XR CHEST 2 VW 11/11/2019 8:26 PM     HISTORY: right chest discomfort      Impression    IMPRESSION: Negative exam.    SHAY GONZALES MD       Medications   aspirin (ASA) chewable tablet 162 mg (162 mg Oral Given 11/11/19 1915)   ketorolac (TORADOL) injection 10 mg (10 mg Intravenous Given 11/11/19 2011)       Assessments & Plan (with Medical Decision Making)   Tru Almanza is a 43 year old male who presented to the department 24 hours of right-sided chest discomfort, only mildly described as achy and pleuritic.  He denies fever, chills, productive cough or shortness of breath.  Afebrile in the department and CBC without leukocytosis.  Lung sounds clear to auscultation and chest radiograph without infiltrate or pneumothorax.  His d-dimer is not elevated, no signs of DVT and low suspicion for aortic dissection.  EKG morphology similar to previous on file with RBBB and troponin within reference range despite duration of symptoms and atypical nature.  It is possible that he is suffering from pleurisy or costochondritis, unlikely pericarditis despite history induced by strep pharyngitis >10 years ago.  Recommend he monitor symptoms closely for expected improvement, follow-up with primary care provider over the next 3 days if symptoms persist.          Disclaimer:  This note consists of symbols derived from keyboarding, dictation, and/or voice recognition software.  As a result, there may be errors in the script that have gone undetected.  Please consider this when interpreting information found in the chart.        I have reviewed the nursing notes.    I have reviewed the findings, diagnosis, plan and need for follow up with the patient.       New Prescriptions    No medications on file       Final diagnoses:   Chest discomfort       11/11/2019   [facility] EMERGENCY DEPARTMENT     Lonnie Campos DO  11/11/19 2058

## 2019-11-12 NOTE — ED NOTES
Pt reports pain to right side of chest, reports started yesterday. Today pain has worsened and is now radiating straight through to back. Pt reports he had something similar to this before, was shipped down to [facility]. MI was r/u and pt was diagnosed with pericarditis

## 2019-12-04 ENCOUNTER — TRANSFERRED RECORDS (OUTPATIENT)
Dept: HEALTH INFORMATION MANAGEMENT | Facility: CLINIC | Age: 44
End: 2019-12-04

## 2019-12-13 ENCOUNTER — TELEPHONE (OUTPATIENT)
Dept: FAMILY MEDICINE | Facility: CLINIC | Age: 44
End: 2019-12-13

## 2019-12-13 ENCOUNTER — TRANSFERRED RECORDS (OUTPATIENT)
Dept: HEALTH INFORMATION MANAGEMENT | Facility: CLINIC | Age: 44
End: 2019-12-13

## 2019-12-13 ASSESSMENT — PATIENT HEALTH QUESTIONNAIRE - PHQ9: SUM OF ALL RESPONSES TO PHQ QUESTIONS 1-9: 0

## 2019-12-13 NOTE — TELEPHONE ENCOUNTER
Panel Management Review      Patient has the following on his problem list:     Depression / Dysthymia review    Measure:  Needs PHQ-9 score of 4 or less during index window.  Administer PHQ-9 and if score is 5 or more, send encounter to provider for next steps.    5 - 7 month window range: 12/15/2019    PHQ-9 SCORE 5/16/2019 6/13/2019 7/9/2019   PHQ-9 Total Score - - -   PHQ-9 Total Score MyChart 6 (Mild depression) - 3 (Minimal depression)   PHQ-9 Total Score 6 3 3       If PHQ-9 recheck is 5 or more, route to provider for next steps.    Patient is due for:  PHQ9      Composite cancer screening  Chart review shows that this patient is due/due soon for the following None  Summary:    Patient is due/failing the following:   PHQ9    Action needed:   Patient needs to do PHQ9.    Type of outreach:    Phone, spoke to patient.  completed PHQ-9    Questions for provider review:    None                                                                                                                                    Cynthia Randle MA       .

## 2019-12-24 ENCOUNTER — TRANSFERRED RECORDS (OUTPATIENT)
Dept: HEALTH INFORMATION MANAGEMENT | Facility: CLINIC | Age: 44
End: 2019-12-24

## 2020-01-14 ENCOUNTER — TRANSFERRED RECORDS (OUTPATIENT)
Dept: HEALTH INFORMATION MANAGEMENT | Facility: CLINIC | Age: 45
End: 2020-01-14

## 2020-03-17 ENCOUNTER — TRANSFERRED RECORDS (OUTPATIENT)
Dept: HEALTH INFORMATION MANAGEMENT | Facility: CLINIC | Age: 45
End: 2020-03-17

## 2020-03-25 ENCOUNTER — TRANSFERRED RECORDS (OUTPATIENT)
Dept: HEALTH INFORMATION MANAGEMENT | Facility: CLINIC | Age: 45
End: 2020-03-25

## 2020-06-11 ENCOUNTER — OFFICE VISIT (OUTPATIENT)
Dept: FAMILY MEDICINE | Facility: CLINIC | Age: 45
End: 2020-06-11
Payer: COMMERCIAL

## 2020-06-11 VITALS — WEIGHT: 229 LBS | RESPIRATION RATE: 18 BRPM | HEIGHT: 76 IN | BODY MASS INDEX: 27.89 KG/M2 | TEMPERATURE: 97.9 F

## 2020-06-11 DIAGNOSIS — L23.7 POISON IVY DERMATITIS: Primary | ICD-10-CM

## 2020-06-11 PROCEDURE — 99213 OFFICE O/P EST LOW 20 MIN: CPT | Performed by: FAMILY MEDICINE

## 2020-06-11 RX ORDER — CLOBETASOL PROPIONATE 0.5 MG/G
CREAM TOPICAL DAILY
Qty: 60 G | Refills: 0 | Status: SHIPPED | OUTPATIENT
Start: 2020-06-11 | End: 2020-06-22

## 2020-06-11 RX ORDER — PREDNISONE 10 MG/1
TABLET ORAL
Qty: 53 TABLET | Refills: 0 | Status: SHIPPED | OUTPATIENT
Start: 2020-06-11 | End: 2020-06-22

## 2020-06-11 ASSESSMENT — MIFFLIN-ST. JEOR: SCORE: 2030.24

## 2020-06-11 NOTE — NURSING NOTE
"Chief Complaint   Patient presents with     Derm Problem     Temp 97.9  F (36.6  C) (Tympanic)   Resp 18   Ht 1.93 m (6' 4\")   Wt 103.9 kg (229 lb)   BMI 27.87 kg/m   Estimated body mass index is 27.87 kg/m  as calculated from the following:    Height as of this encounter: 1.93 m (6' 4\").    Weight as of this encounter: 103.9 kg (229 lb).  Patient presents to the clinic using No DME      Health Maintenance that is potentially due pending provider review:    Health Maintenance Due   Topic Date Due     HIV SCREENING  12/07/1990     DTAP/TDAP/TD IMMUNIZATION (2 - Td) 02/22/2016     PREVENTIVE CARE VISIT  10/11/2017     PHQ-9  06/13/2020                "

## 2020-06-11 NOTE — PATIENT INSTRUCTIONS
Patient Education     Avoiding Poison Ivy, Poison Oak, and Poison Sumac  Poison oak, poison ivy, and poison sumac are plants that can cause skin rashes. The problem is a sap oil called urushiol that is contained in these plants. If you're allergic to urushiol, touching one of these plants may cause your skin to react. Within hours or days, you may have a red, swollen, itchy rash. You can't stop the rash. But you can soothe the itching.        Recognizing these plants  You can help prevent a poison oak, poison ivy, or poison sumac rash. Know what these plants look like. And then avoid them. They grow in the form of grace, small plants, and large bushes. In most cases, poison oak and poison ivy have 3 leaves per stem. Poison sumac has from 7 leaves to 13 leaves per stem. Watch out for these plants when you go to any outdoor area, from a friend's overgrown back yard to the [facility]. Urushiol is present in these plants all year round, even when the leaves are gone. So always be on the lookout.  What causes a reaction?  Poison oak, poison ivy, and poison sumac thrive mainly in unmaintained outdoor areas. If you touch these plants, you may get a rash. You may also react if you touch something that came in contact with urushiol. This could be a dog or cat, clothing, or equipment. But the rash caused by these plants is not contagious.  Steps to prevention  When heading outdoors, take these preventive steps:    Avoid touching any of these plants.    Wear long pants and a long-sleeved shirt.    If you're going to a heavily wooded or brushy area, also put on gloves, a hat, and boots.    If you are very sensitive, apply bentoquatam 5% topical cream to all exposed areas of your skin. This creates a layer of protection between your skin and any sap oil you may touch.    If you come in contact with these plants or the oil, wash with soap and water as soon as possible.    Wash clothing and animals that come in contact with  these plants as well. Urushiol may stay on them and cause a rash when you touch them in the future.  Date Last Reviewed: 3/1/2017    2713-4702 [employer] [address] [place] [address], [place] [address]. All rights reserved. This information is not intended as a substitute for professional medical care. Always follow your healthcare professional's instructions.

## 2020-06-11 NOTE — PROGRESS NOTES
SUBJECTIVE   Tru Almanza is a 44 year old male who presents with     Rash      Duration: Sunday     Description  Location: legs, arms, feet, back of head   Itching: moderate    Intensity:  moderate    Accompanying signs and symptoms: None    History (similar episodes/previous evaluation): has had it pretty bad in the past     Precipitating or alleviating factors:  New exposures:  Was clearing some land    Recent travel: no      Therapies tried and outcome: calamine lotion        PCP   Dori Avilez [phone]    Health Maintenance        Health Maintenance Due   Topic Date Due     HIV SCREENING  12/07/1990     DTAP/TDAP/TD IMMUNIZATION (2 - Td) 02/22/2016     PREVENTIVE CARE VISIT  10/11/2017     PHQ-9  06/13/2020       HPI        Patient Active Problem List   Diagnosis     Major depression, recurrent (H)     Pericarditis     Persistent disorder of initiating or maintaining sleep     Moderate episode of recurrent major depressive disorder (H)     Acute reaction to stress     Current Outpatient Medications   Medication     order for DME     order for DME     Ibuprofen (ADVIL PO)     No current facility-administered medications for this visit.        Patient Active Problem List   Diagnosis     Major depression, recurrent (H)     Pericarditis     Persistent disorder of initiating or maintaining sleep     Moderate episode of recurrent major depressive disorder (H)     Acute reaction to stress     Past Surgical History:   Procedure Laterality Date     VASECTOMY  12/12/2011    Procedure:VASECTOMY; Surgical Exploration of the Scrotum,Excision of Vas Deferens Vasectomy Clip,Vasectomy; Surgeon:ALFONSO MCGOVERN; Location:[place] OR       Social History     Tobacco Use     Smoking status: Never Smoker     Smokeless tobacco: Never Used   Substance Use Topics     Alcohol use: No     Family History   Problem Relation Age of Onset     Thyroid Disease Mother      Hypertension Father      Breast Cancer Maternal Grandmother  "         Current Outpatient Medications   Medication Sig Dispense Refill     order for DME 20-30 mm Hg knee high Compression stockings 3 Units 0     order for DME Equipment being ordered: Dynaflex insert 2 Units 0     Ibuprofen (ADVIL PO)        Allergies   Allergen Reactions     Erythromycin      At 2 years old     Recent Labs   Lab Test 11/11/19  1913 07/02/17  1830 10/11/16  1035 12/16/13  1643   LDL  --   --  98 93   HDL  --   --  41 42   TRIG  --   --  108 97   ALT 19 25  --   --    CR 0.84 0.81  --   --    GFRESTIMATED >90 >90  Non African American GFR Calc    --   --    GFRESTBLACK >90 >90  African American GFR Calc    --   --    POTASSIUM 4.0 3.8  --   --    TSH  --   --  1.60 4.07      BP Readings from Last 3 Encounters:   11/11/19 (!) 139/91   09/12/19 131/86   07/09/19 104/70    Wt Readings from Last 3 Encounters:   06/11/20 103.9 kg (229 lb)   11/11/19 99.8 kg (220 lb)   09/12/19 95.3 kg (210 lb)                    Reviewed and updated:  Tobacco  Allergies  Meds  Med Hx  Surg Hx  Fam Hx  Soc Hx     ROS:  Constitutional, HEENT, cardiovascular, pulmonary, gi and gu systems are negative, except as otherwise noted.    PHYSICAL EXAM   Temp 97.9  F (36.6  C) (Tympanic)   Resp 18   Ht 1.93 m (6' 4\")   Wt 103.9 kg (229 lb)   BMI 27.87 kg/m    Body mass index is 27.87 kg/m .  GENERAL: healthy, alert and no distress  NECK: no adenopathy, no asymmetry, masses, or scars and thyroid normal to palpation  RESP: lungs clear to auscultation - no rales, rhonchi or wheezes  CV: regular rate and rhythm, normal S1 S2, no S3 or S4, no murmur, click or rub, no peripheral edema and peripheral pulses strong  ABDOMEN: soft, nontender, no hepatosplenomegaly, no masses and bowel sounds normal  MS: no gross musculoskeletal defects noted, no edema  SKIN: erythematous rashes with blistering involving neck, arms and lower legs                Assessment & Plan     (L23.7) Poison ivy dermatitis  (primary encounter " diagnosis)  Comment: Pressure secondary to poison ivy dermatitis.  Prednisone and Temovate prescribed, common side effect discussed.  Home care explained and written information provided.  Follow-up if symptoms persist or worsen.  All question answered.  Plan: predniSONE (DELTASONE) 10 MG tablet, clobetasol        (TEMOVATE) 0.05 % external cream                Patient Instructions     Patient Education     Avoiding Poison Ivy, Poison Oak, and Poison Sumac  Poison oak, poison ivy, and poison sumac are plants that can cause skin rashes. The problem is a sap oil called urushiol that is contained in these plants. If you're allergic to urushiol, touching one of these plants may cause your skin to react. Within hours or days, you may have a red, swollen, itchy rash. You can't stop the rash. But you can soothe the itching.        Recognizing these plants  You can help prevent a poison oak, poison ivy, or poison sumac rash. Know what these plants look like. And then avoid them. They grow in the form of grace, small plants, and large bushes. In most cases, poison oak and poison ivy have 3 leaves per stem. Poison sumac has from 7 leaves to 13 leaves per stem. Watch out for these plants when you go to any outdoor area, from a friend's overgrown back yard to [facility]. Urushiol is present in these plants all year round, even when the leaves are gone. So always be on the lookout.  What causes a reaction?  Poison oak, poison ivy, and poison sumac thrive mainly in unmaintained outdoor areas. If you touch these plants, you may get a rash. You may also react if you touch something that came in contact with urushiol. This could be a dog or cat, clothing, or equipment. But the rash caused by these plants is not contagious.  Steps to prevention  When heading outdoors, take these preventive steps:    Avoid touching any of these plants.    Wear long pants and a long-sleeved shirt.    If you're going to a heavily wooded or brushy  area, also put on gloves, a hat, and boots.    If you are very sensitive, apply bentoquatam 5% topical cream to all exposed areas of your skin. This creates a layer of protection between your skin and any sap oil you may touch.    If you come in contact with these plants or the oil, wash with soap and water as soon as possible.    Wash clothing and animals that come in contact with these plants as well. Urushiol may stay on them and cause a rash when you touch them in the future.  Date Last Reviewed: 3/1/2017    2791-1195 [employer] [address] [place], [place] [id number]. All rights reserved. This information is not intended as a substitute for professional medical care. Always follow your healthcare professional's instructions.               Kristian Elizabeth MD  [facility]

## 2020-06-11 NOTE — LETTER
June 11, 2020      Tru Almanza  [address] [facility] [address]        To Whom It May Concern:        Tru Almanza was seen in our clinic. Kindly excuse his work absence for next 3 days.         Sincerely,          Kristian Elizabeth MD

## 2020-06-22 ENCOUNTER — TELEPHONE (OUTPATIENT)
Dept: FAMILY MEDICINE | Facility: CLINIC | Age: 45
End: 2020-06-22

## 2020-06-22 ENCOUNTER — OFFICE VISIT (OUTPATIENT)
Dept: FAMILY MEDICINE | Facility: CLINIC | Age: 45
End: 2020-06-22
Payer: COMMERCIAL

## 2020-06-22 VITALS
DIASTOLIC BLOOD PRESSURE: 72 MMHG | TEMPERATURE: 97.9 F | WEIGHT: 229 LBS | BODY MASS INDEX: 27.87 KG/M2 | OXYGEN SATURATION: 98 % | RESPIRATION RATE: 18 BRPM | SYSTOLIC BLOOD PRESSURE: 136 MMHG | HEART RATE: 76 BPM

## 2020-06-22 DIAGNOSIS — L23.7 POISON IVY DERMATITIS: ICD-10-CM

## 2020-06-22 PROCEDURE — 99213 OFFICE O/P EST LOW 20 MIN: CPT | Performed by: NURSE PRACTITIONER

## 2020-06-22 RX ORDER — CLOBETASOL PROPIONATE 0.5 MG/G
CREAM TOPICAL DAILY
Qty: 45 G | Refills: 0 | Status: SHIPPED | OUTPATIENT
Start: 2020-06-22 | End: 2020-08-11

## 2020-06-22 NOTE — TELEPHONE ENCOUNTER
Reason for call:  Patient reporting a symptom    Symptom or request: Pt says he was seen by Dr Elizabeth 6/11 for poison ivy and was put on Prednisone and Clobetasol. He says now he has developed hives all over- Chest, Abdomen, back, legs, arms and it's itchy. He wanted to check with Dr Elena who is his PCP on what she would recommend.       Have you been treated for this before? Yes    Additional comments: [facility] pharmacy    Phone Number patient can be reached at:  Home number on file [phone] (home)    Best Time:  anytime    Can we leave a detailed message on this number:  YES    Call taken on 6/22/2020 at 8:59 AM by Daria Matta

## 2020-06-22 NOTE — PATIENT INSTRUCTIONS
Stay off of the prednisone for now     Restart steroid cream two times daily for 14 days     Can use an over the counter Burrow's solution as well     Start an over the counter anti-histamine such as Zyrtec or Claritin     Make sure all clothing is washed well     Check into clinic on Friday- (can call in) on update

## 2020-06-22 NOTE — PROGRESS NOTES
SUBJECTIVE   Tru Almanza is a  male who presents to clinic today for the following health issue(s):       Rash      Duration: original rash started on 6/7 the  Poison Ivy faded some but then 4-5 days after prednisone started with a different rash    Description  Location: trunk, legs and ne  Itching: moderate    Intensity:  moderate    Accompanying signs and symptoms: None    History (similar episodes/previous evaluation): Recent poison ivy    Precipitating or alleviating factors:  seen by Dr Elizabeth 6/11 for poison ivy and was put on Prednisone and Clobetasol. He says now he has developed hives all over- Chest, Abdomen, back, legs, arms and it's itchy.  New exposures:  medication clobetasol, calamine, prednisone, grasses and trees  Recent travel: no      Therapies tried and outcome: stopped everything on 6/20/20    Pretty rapid decrease in redness and rash when started the prednisone - then just stopped improving and noted new breakouts   Neck improved significantly  - pretty much gone   Stopped prednisone felt was on too high of a dose, so tapered down sooner   Was doing Clobetasol once daily and hasn't been doing for a few days  Has washed everything that came in contact    PCP   Dori Avilez [phone]    Health Maintenance        Health Maintenance Due   Topic Date Due     HIV SCREENING  12/07/1990     DTAP/TDAP/TD IMMUNIZATION (2 - Td) 02/22/2016     PREVENTIVE CARE VISIT  10/11/2017     PHQ-9  06/13/2020       HPI        Patient Active Problem List   Diagnosis     Major depression, recurrent (H)     Pericarditis     Persistent disorder of initiating or maintaining sleep     Moderate episode of recurrent major depressive disorder (H)     Acute reaction to stress     Current Outpatient Medications   Medication     clobetasol (TEMOVATE) 0.05 % external cream     Ibuprofen (ADVIL PO)     order for DME     order for DME     No current facility-administered medications for this visit.   "      Reviewed and updated as needed this visit by Provider:  Tobacco  Allergies  Meds  Med Hx  Surg Hx  Fam Hx  Soc Hx     ROS:  Constitutional, HEENT, cardiovascular, pulmonary, gi and gu systems are negative, except as otherwise noted.    PHYSICAL EXAM   /72   Pulse 76   Temp 97.9  F (36.6  C)   Resp 18   Wt 103.9 kg (229 lb)   SpO2 98%   BMI 27.87 kg/m    Body mass index is 27.87 kg/m .  GENERAL APPEARANCE: healthy, alert and no distress  SKIN: acute, streak-like erythematous, edematous vesicular rash on bilateral upper extremities as pictured below consistent with contact dermatitis due to poison ivy.                 Diagnostic Test Results:  none     ASSESSMENT & PLAN   Assessment & Plan     1. Poison ivy dermatitis  Acute, some improvement initially then with new breakouts. Advised to stay off of prednisone for now, restart Clobetasol for 14 days and start over the counter anti-histamine. Check in Friday with progress.   - clobetasol (TEMOVATE) 0.05 % external cream; Apply topically daily  Dispense: 45 g; Refill: 0     BMI:   Estimated body mass index is 27.87 kg/m  as calculated from the following:    Height as of 6/11/20: 1.93 m (6' 4\").    Weight as of 6/11/20: 103.9 kg (229 lb).           Risks, benefits, side effects and rationale for treatment plan fully discussed with the patient and understanding expressed.    Patient Instructions   Stay off of the prednisone for now     Restart steroid cream two times daily for 14 days     Can use an over the counter Burrow's solution as well     Start an over the counter anti-histamine such as Zyrtec or Claritin     Make sure all clothing is washed well     Check into clinic on Friday- (can call in) on update        Return in about 1 week (around 6/29/2020) for Recheck.    PRATIMA Roberts [facility]    Risks, benefits, side effects and rationale for treatment plan fully discussed with the patient and understanding " expressed.

## 2020-06-22 NOTE — TELEPHONE ENCOUNTER
Message below relayed to pt - he was actually checking in to clinic to see Devi ANDUJAR.    Pearl CODY, RN, BSN

## 2020-06-22 NOTE — TELEPHONE ENCOUNTER
Would have him take benadryl 50 mg q 6 hours  Stop the clobetasol.   Would not stop the prednisone yet   Let me know how is doing tomorrow, can fit him in in the afternoon if needed

## 2020-07-29 ENCOUNTER — TRANSFERRED RECORDS (OUTPATIENT)
Dept: HEALTH INFORMATION MANAGEMENT | Facility: CLINIC | Age: 45
End: 2020-07-29

## 2020-08-11 ENCOUNTER — OFFICE VISIT (OUTPATIENT)
Dept: FAMILY MEDICINE | Facility: CLINIC | Age: 45
End: 2020-08-11
Payer: COMMERCIAL

## 2020-08-11 VITALS
DIASTOLIC BLOOD PRESSURE: 86 MMHG | SYSTOLIC BLOOD PRESSURE: 136 MMHG | RESPIRATION RATE: 16 BRPM | OXYGEN SATURATION: 97 % | TEMPERATURE: 97.6 F | HEART RATE: 74 BPM | WEIGHT: 228 LBS | BODY MASS INDEX: 27.75 KG/M2

## 2020-08-11 DIAGNOSIS — M75.101 TEAR OF RIGHT ROTATOR CUFF, UNSPECIFIED TEAR EXTENT, UNSPECIFIED WHETHER TRAUMATIC: ICD-10-CM

## 2020-08-11 DIAGNOSIS — M75.81 BONE SPUR OF ACROMIOCLAVICULAR JOINT, RIGHT: ICD-10-CM

## 2020-08-11 DIAGNOSIS — Z01.818 PREOP GENERAL PHYSICAL EXAM: Primary | ICD-10-CM

## 2020-08-11 PROBLEM — F33.1 MODERATE EPISODE OF RECURRENT MAJOR DEPRESSIVE DISORDER (H): Status: RESOLVED | Noted: 2019-07-09 | Resolved: 2020-08-11

## 2020-08-11 PROCEDURE — 99214 OFFICE O/P EST MOD 30 MIN: CPT | Performed by: FAMILY MEDICINE
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
## 2020-08-11 NOTE — NURSING NOTE
"Chief Complaint   Patient presents with     Pre-Op Exam     Rt shoulder arthroscopy       Initial /86 (BP Location: Right arm)   Pulse 74   Temp 97.6  F (36.4  C) (Tympanic)   Resp 16   Wt 103.4 kg (228 lb)   SpO2 97%   BMI 27.75 kg/m   Estimated body mass index is 27.75 kg/m  as calculated from the following:    Height as of 6/11/20: 1.93 m (6' 4\").    Weight as of this encounter: 103.4 kg (228 lb).    Patient presents to the clinic using No DME    Health Maintenance that is potentially due pending provider review:  NONE    n/a    Is there anyone who you would like to be able to receive your results? No  If yes have patient fill out PRIYA    "

## 2020-08-15 ENCOUNTER — MYC MEDICAL ADVICE (OUTPATIENT)
Dept: FAMILY MEDICINE | Facility: CLINIC | Age: 45
End: 2020-08-15

## 2020-08-17 NOTE — TELEPHONE ENCOUNTER
Please call pt if he is having surgery done with [facility] we are not able to do the swab he has to get it through [facility]

## 2020-08-28 ENCOUNTER — TRANSFERRED RECORDS (OUTPATIENT)
Dept: HEALTH INFORMATION MANAGEMENT | Facility: CLINIC | Age: 45
End: 2020-08-28

## 2020-09-11 ENCOUNTER — TRANSFERRED RECORDS (OUTPATIENT)
Dept: HEALTH INFORMATION MANAGEMENT | Facility: CLINIC | Age: 45
End: 2020-09-11

## 2020-10-13 ENCOUNTER — TRANSFERRED RECORDS (OUTPATIENT)
Dept: HEALTH INFORMATION MANAGEMENT | Facility: CLINIC | Age: 45
End: 2020-10-13

## 2020-11-16 ENCOUNTER — HEALTH MAINTENANCE LETTER (OUTPATIENT)
Age: 45
End: 2020-11-16

## 2021-06-01 ENCOUNTER — RECORDS - HEALTHEAST (OUTPATIENT)
Dept: ADMINISTRATIVE | Facility: CLINIC | Age: 46
End: 2021-06-01

## 2021-09-12 ENCOUNTER — HEALTH MAINTENANCE LETTER (OUTPATIENT)
Age: 46
End: 2021-09-12

## 2021-11-14 ENCOUNTER — OFFICE VISIT (OUTPATIENT)
Dept: URGENT CARE | Facility: URGENT CARE | Age: 46
End: 2021-11-14
Payer: COMMERCIAL

## 2021-11-14 VITALS
DIASTOLIC BLOOD PRESSURE: 92 MMHG | WEIGHT: 225 LBS | BODY MASS INDEX: 27.39 KG/M2 | SYSTOLIC BLOOD PRESSURE: 138 MMHG | TEMPERATURE: 99.7 F | HEART RATE: 92 BPM | OXYGEN SATURATION: 98 % | RESPIRATION RATE: 20 BRPM

## 2021-11-14 DIAGNOSIS — R03.0 ELEVATED BP WITHOUT DIAGNOSIS OF HYPERTENSION: ICD-10-CM

## 2021-11-14 DIAGNOSIS — J02.9 SORE THROAT: Primary | ICD-10-CM

## 2021-11-14 DIAGNOSIS — R05.9 COUGH: ICD-10-CM

## 2021-11-14 LAB
DEPRECATED S PYO AG THROAT QL EIA: NEGATIVE
GROUP A STREP BY PCR: NOT DETECTED

## 2021-11-14 PROCEDURE — 99214 OFFICE O/P EST MOD 30 MIN: CPT | Performed by: PHYSICIAN ASSISTANT

## 2021-11-14 PROCEDURE — 87651 STREP A DNA AMP PROBE: CPT | Performed by: PHYSICIAN ASSISTANT

## 2021-11-14 RX ORDER — ALBUTEROL SULFATE 90 UG/1
AEROSOL, METERED RESPIRATORY (INHALATION)
Qty: 18 G | Refills: 0 | Status: SHIPPED | OUTPATIENT
Start: 2021-11-14

## 2021-11-14 RX ORDER — PREDNISONE 20 MG/1
40 TABLET ORAL DAILY
Qty: 10 TABLET | Refills: 0 | Status: SHIPPED | OUTPATIENT
Start: 2021-11-14 | End: 2021-11-19

## 2021-11-14 NOTE — PROGRESS NOTES
Assessment & Plan     Sore throat  Rapid strep negative. COVID declined. This is likely viral. Continue with supportive care. Get plenty of rest and push fluids. Can use Tylenol and/or ibuprofen as needed for pain and/or fever control. Discussed quarantine guidelines. Return to clinic if symptoms worsen or do not improve; otherwise follow up as needed       - Streptococcus A Rapid Screen w/Reflex to PCR - Clinic Collect  - Group A Streptococcus PCR Throat Swab    Cough  Will treat with predniSONE (DELTASONE) 20 MG tablet; Take 2 tablets (40 mg) by mouth daily for 5 days and albuterol (PROAIR HFA/PROVENTIL HFA/VENTOLIN HFA) 108 (90 Base) MCG/ACT inhaler; Inhale 2 puffs every 4-6 hours as needed for cough, wheezing, or shortness of breath. Return to clinic if symptoms worsen or do not improve; otherwise follow up as needed      Elevated BP without diagnosis of hypertension  Monitor blood pressure at home and if average pressure is greater than or equal to 140/90 follow up with primary care provider                     Return in about 1 week (around 11/21/2021), or if symptoms worsen or fail to improve.    Susana Givens PA-C  [facility] [place]            Subjective   Chief Complaint   Patient presents with     Throat Pain     sore for 4 days, history of pericarditis. cough, sinus drainage, chills          HPI     URI Adult    Onset of symptoms was 4 day(s) ago.  Course of illness is same.    Severity moderate  Current and Associated symptoms: cough, sore throat, sinus drainage   Treatment measures tried include Tylenol/Ibuprofen.  Predisposing factors include None.              Review of Systems   Constitutional, HEENT, cardiovascular, pulmonary, gi and gu systems are negative, except as otherwise noted.      Objective    BP (!) 138/92 (BP Location: Right arm, Patient Position: Sitting, Cuff Size: Adult Regular)   Pulse 92   Temp 99.7  F (37.6  C) (Tympanic)   Resp 20   Wt 102.1 kg  (225 lb)   SpO2 98%   BMI 27.39 kg/m    Body mass index is 27.39 kg/m .     Physical Exam  Constitutional:       General: He is not in acute distress.     Appearance: He is well-developed.   HENT:      Head: Normocephalic and atraumatic.      Right Ear: Tympanic membrane and ear canal normal.      Left Ear: Tympanic membrane and ear canal normal.      Mouth/Throat:      Pharynx: Posterior oropharyngeal erythema present.   Eyes:      Conjunctiva/sclera: Conjunctivae normal.   Cardiovascular:      Rate and Rhythm: Normal rate and regular rhythm.   Pulmonary:      Effort: Pulmonary effort is normal.      Breath sounds: Normal breath sounds.   Skin:     General: Skin is warm and dry.      Findings: No rash.   Psychiatric:         Behavior: Behavior normal.

## 2021-11-14 NOTE — LETTER
[facility] [place]  [address] [address]  Phone: [phone]  Fax: [phone]    November 14, 2021        Tru Almanza  [address] [address] [place] [id number]          To whom it may concern:    RE: Tru Almanza    Patient was seen and treated today at our clinic and missed work 11/19/21 and 11/20/21    Please contact me for questions or concerns.      Sincerely,        Susana Givens PA-C

## 2021-11-26 ENCOUNTER — HOSPITAL ENCOUNTER (EMERGENCY)
Facility: CLINIC | Age: 46
Discharge: HOME | End: 2021-11-26
Attending: PHYSICIAN ASSISTANT | Admitting: PHYSICIAN ASSISTANT
Payer: COMMERCIAL

## 2021-11-26 ENCOUNTER — APPOINTMENT (OUTPATIENT)
Dept: CT IMAGING | Facility: CLINIC | Age: 46
End: 2021-11-26
Attending: PHYSICIAN ASSISTANT
Payer: COMMERCIAL

## 2021-11-26 ENCOUNTER — LAB (OUTPATIENT)
Dept: LAB | Facility: CLINIC | Age: 46
End: 2021-11-26
Payer: COMMERCIAL

## 2021-11-26 VITALS
OXYGEN SATURATION: 94 % | RESPIRATION RATE: 28 BRPM | DIASTOLIC BLOOD PRESSURE: 71 MMHG | HEART RATE: 82 BPM | WEIGHT: 215 LBS | HEIGHT: 76 IN | TEMPERATURE: 98.2 F | SYSTOLIC BLOOD PRESSURE: 122 MMHG | BODY MASS INDEX: 26.18 KG/M2

## 2021-11-26 DIAGNOSIS — U07.1 PNEUMONIA DUE TO 2019 NOVEL CORONAVIRUS: ICD-10-CM

## 2021-11-26 DIAGNOSIS — R06.00 DYSPNEA: Primary | ICD-10-CM

## 2021-11-26 DIAGNOSIS — J12.82 PNEUMONIA DUE TO 2019 NOVEL CORONAVIRUS: ICD-10-CM

## 2021-11-26 LAB
ALBUMIN SERPL-MCNC: 2.3 G/DL (ref 3.4–5)
ALP SERPL-CCNC: 45 U/L (ref 40–150)
ALT SERPL W P-5'-P-CCNC: 33 U/L (ref 0–70)
ANION GAP SERPL CALCULATED.3IONS-SCNC: 7 MMOL/L (ref 3–14)
AST SERPL W P-5'-P-CCNC: 25 U/L (ref 0–45)
BASOPHILS # BLD AUTO: 0.1 10E3/UL (ref 0–0.2)
BASOPHILS NFR BLD AUTO: 0 %
BILIRUB SERPL-MCNC: 0.6 MG/DL (ref 0.2–1.3)
BUN SERPL-MCNC: 12 MG/DL (ref 7–30)
CALCIUM SERPL-MCNC: 8.8 MG/DL (ref 8.5–10.1)
CHLORIDE BLD-SCNC: 101 MMOL/L (ref 94–109)
CO2 SERPL-SCNC: 27 MMOL/L (ref 20–32)
CREAT SERPL-MCNC: 0.81 MG/DL (ref 0.66–1.25)
D DIMER PPP FEU-MCNC: 1.75 UG/ML FEU (ref 0–0.5)
EOSINOPHIL # BLD AUTO: 0.1 10E3/UL (ref 0–0.7)
EOSINOPHIL NFR BLD AUTO: 1 %
ERYTHROCYTE [DISTWIDTH] IN BLOOD BY AUTOMATED COUNT: 13.5 % (ref 10–15)
GFR SERPL CREATININE-BSD FRML MDRD: >90 ML/MIN/1.73M2
GLUCOSE BLD-MCNC: 101 MG/DL (ref 70–99)
HCT VFR BLD AUTO: 44.4 % (ref 40–53)
HGB BLD-MCNC: 15.2 G/DL (ref 13.3–17.7)
HOLD SPECIMEN: NORMAL
IMM GRANULOCYTES # BLD: 0.2 10E3/UL
IMM GRANULOCYTES NFR BLD: 2 %
LYMPHOCYTES # BLD AUTO: 1.8 10E3/UL (ref 0.8–5.3)
LYMPHOCYTES NFR BLD AUTO: 15 %
MCH RBC QN AUTO: 28.8 PG (ref 26.5–33)
MCHC RBC AUTO-ENTMCNC: 34.2 G/DL (ref 31.5–36.5)
MCV RBC AUTO: 84 FL (ref 78–100)
MONOCYTES # BLD AUTO: 1.4 10E3/UL (ref 0–1.3)
MONOCYTES NFR BLD AUTO: 12 %
NEUTROPHILS # BLD AUTO: 8.5 10E3/UL (ref 1.6–8.3)
NEUTROPHILS NFR BLD AUTO: 70 %
NRBC # BLD AUTO: 0 10E3/UL
NRBC BLD AUTO-RTO: 0 /100
PLATELET # BLD AUTO: 331 10E3/UL (ref 150–450)
POTASSIUM BLD-SCNC: 3 MMOL/L (ref 3.4–5.3)
PROT SERPL-MCNC: 6.3 G/DL (ref 6.8–8.8)
RBC # BLD AUTO: 5.27 10E6/UL (ref 4.4–5.9)
SODIUM SERPL-SCNC: 135 MMOL/L (ref 133–144)
WBC # BLD AUTO: 12.1 10E3/UL (ref 4–11)

## 2021-11-26 PROCEDURE — 85025 COMPLETE CBC W/AUTO DIFF WBC: CPT

## 2021-11-26 PROCEDURE — 82040 ASSAY OF SERUM ALBUMIN: CPT | Performed by: PHYSICIAN ASSISTANT

## 2021-11-26 PROCEDURE — 85379 FIBRIN DEGRADATION QUANT: CPT

## 2021-11-26 PROCEDURE — 36415 COLL VENOUS BLD VENIPUNCTURE: CPT | Performed by: PHYSICIAN ASSISTANT

## 2021-11-26 PROCEDURE — 250N000009 HC RX 250: Performed by: PHYSICIAN ASSISTANT

## 2021-11-26 PROCEDURE — 250N000011 HC RX IP 250 OP 636: Performed by: PHYSICIAN ASSISTANT

## 2021-11-26 PROCEDURE — 99284 EMERGENCY DEPT VISIT MOD MDM: CPT | Performed by: PHYSICIAN ASSISTANT

## 2021-11-26 PROCEDURE — 36415 COLL VENOUS BLD VENIPUNCTURE: CPT

## 2021-11-26 PROCEDURE — 71275 CT ANGIOGRAPHY CHEST: CPT

## 2021-11-26 PROCEDURE — 99285 EMERGENCY DEPT VISIT HI MDM: CPT | Mod: 25 | Performed by: PHYSICIAN ASSISTANT

## 2021-11-26 RX ORDER — IOPAMIDOL 755 MG/ML
88 INJECTION, SOLUTION INTRAVASCULAR ONCE
Status: COMPLETED | OUTPATIENT
Start: 2021-11-26 | End: 2021-11-26

## 2021-11-26 RX ADMIN — SODIUM CHLORIDE 100 ML: 9 INJECTION, SOLUTION INTRAVENOUS at 17:08

## 2021-11-26 RX ADMIN — IOPAMIDOL 88 ML: 755 INJECTION, SOLUTION INTRAVENOUS at 17:08

## 2021-11-26 ASSESSMENT — MIFFLIN-ST. JEOR: SCORE: 1961.73

## 2021-11-26 NOTE — ED PROVIDER NOTES
History     Chief Complaint   Patient presents with     Shortness of Breath     covid symptoms x 19 days.      Abnormal Labs     elevated d-dimer.     HARRISON Almanza is a 45 year old male who is otherwise overall healthy per his report who presents to the emergency department at recommendation of provider.  He has had symptoms consistent with COVID-19 for the last 19 days including subjective fever, chills, myalgias, loss of taste and smell, cough, shortness of breath with activity, wheezing, diarrhea.  He reports that his fever and diarrhea symptoms have begin to improve however he does have persistent shortness of breath accompanied by chest soreness with coughing with activity.  He had a virtual visit with an outside provider who ordered laboratory testing including CBC, D-dimer and when D-dimer came back elevated at 1.75 he was instructed to come in.  He denies any significant palpitations, vomiting, abdominal pain, lower extremity swelling.  He has not had any history of DVT/PE in the past.  He is a non-smoker.      Allergies:  Allergies   Allergen Reactions     Erythromycin      At 2 years old     Problem List:    Patient Active Problem List    Diagnosis Date Noted     Acute reaction to stress 07/09/2019     Priority: Medium     Persistent disorder of initiating or maintaining sleep 10/11/2016     Priority: Medium     Pericarditis 09/14/2016     Priority: Medium     9/14/16 Reports past history when he was young        Past Medical History:    Past Medical History:   Diagnosis Date     Pericarditis      Past Surgical History:    Past Surgical History:   Procedure Laterality Date     VASECTOMY  12/12/2011    Procedure:VASECTOMY; Surgical Exploration of the Scrotum,Excision of Vas Deferens Vasectomy Clip,Vasectomy; Surgeon:ALFONSO MCGOVERN; Location:[facility] OR     Family History:    Family History   Problem Relation Age of Onset     Thyroid Disease Mother      Hypertension Father      Breast Cancer Maternal  "Grandmother      Social History:  Marital Status:   [2]  Social History     Tobacco Use     Smoking status: Never Smoker     Smokeless tobacco: Never Used   Substance Use Topics     Alcohol use: No     Drug use: No      Medications:    albuterol (PROAIR HFA/PROVENTIL HFA/VENTOLIN HFA) 108 (90 Base) MCG/ACT inhaler  Ibuprofen (ADVIL PO)  order for DME  order for DME      Review of Systems   Constitutional: Positive for activity change, appetite change, chills and fever.   HENT:        Loss of taste and smell   Eyes: Negative for photophobia, pain, discharge, itching and visual disturbance.   Respiratory: Positive for cough, chest tightness, shortness of breath and wheezing.    Cardiovascular: Negative for palpitations and leg swelling.   Gastrointestinal: Positive for diarrhea. Negative for abdominal pain, nausea and vomiting.   Genitourinary: Negative for dysuria and hematuria.   Musculoskeletal: Positive for myalgias.   Skin: Negative for color change, rash and wound.   Neurological: Positive for headaches.     Physical Exam   BP: 101/58  Pulse: 82  Temp: 98.2  F (36.8  C)  Resp: 28  Height: 193 cm (6' 4\")  Weight: 97.5 kg (215 lb)  SpO2: 92 %  Physical Exam  GENERAL APPEARANCE: healthy, alert and no distress  EYES: EOMI,  PERRL, conjunctiva clear  HENT: ear canals and TM's normal.  Nose and mouth without ulcers, erythema or lesions  NECK: supple, nontender, no lymphadenopathy  RESP: crackles throughout both lungs however more prominent on right side   CV: regular rates and rhythm, normal S1 S2, no murmur noted  ABDOMEN:  soft, nontender, no HSM or masses and bowel sounds normal  SKIN: no suspicious lesions or rashes  ED Course        Procedures       Critical Care time:  none        Results for orders placed or performed during the hospital encounter of 11/26/21   CT Chest Pulmonary Embolism w Contrast     Status: None    Narrative    EXAM: CT CHEST PULMONARY EMBOLISM W CONTRAST  LOCATION: [facility]  DATE/TIME: 11/26/2021 5:05 PM    INDICATION: PE suspected, low/intermediate prob, positive D-dimer, shortness of breath, elevated D-dimer, cough, fever, diarrhea, symptoms of COVID  COMPARISON: None.  TECHNIQUE: CT chest pulmonary angiogram during arterial phase injection of IV contrast. Multiplanar reformats and MIP reconstructions were performed. Dose reduction techniques were used.   CONTRAST: 88 mL Isovue 370    FINDINGS:  ANGIOGRAM CHEST: Pulmonary arteries are normal caliber and negative for pulmonary emboli. Thoracic aorta is negative for dissection. No CT evidence of right heart strain.    LUNGS AND PLEURA: 3 mm indeterminate pulmonary nodule right upper lobe on image 67. Moderate areas of groundglass opacity and developing consolidation more prominent towards the lower chest. No effusion.    MEDIASTINUM/AXILLAE: Normal.    CORONARY ARTERY CALCIFICATION: None.     UPPER ABDOMEN: Normal.    MUSCULOSKELETAL: Degenerative disease. Remote postoperative changes left scapula.      Impression    IMPRESSION:  1.  Moderate areas of groundglass opacity and developing consolidation consistent with patient's known COVID pneumonia. Follow-up recommended in 3 months to ensure resolution and exclude other underlying pathology.  2.  3 mm pulmonary nodule right upper lobe. See guidelines below if no prior.  3.  No pulmonary embolus aortic aneurysm or dissection.    Commonly reported imaging features of (COVID-19) pneumonia are present. Influenza pneumonia and organizing pneumonia as can be seen in the setting of drug toxicity and connective tissue disease can cause a similar imaging pattern.    REFERENCE:  Guidelines for Management of Incidental Pulmonary Nodules Detected on CT Images: From the Fleischner Society 2017.   Guidelines apply to incidental nodules in patients who are 35 years or older.  Guidelines do not apply to lung cancer screening, patients with immunosuppression, or patients with known  primary cancer.    SINGLE NODULE  Nodule size <6 mm  Low-risk patients: No follow-up needed.  High-risk patients: Optional follow-up at 12 months.    Nodule size 6-8 mm  Low-risk patients: Follow-up CT at 6-12 months, then consider CT at 18-24 months.  High-risk patients: Follow-up CT at 6-12 months, then at 18-24 months if no change.    Nodule size >8 mm  Either low or high-risk patients:  Consider CT, PET/CT, or tissue sampling at 3 months.    Consider referral to lung nodule clinic.     Comprehensive metabolic panel     Status: Abnormal   Result Value Ref Range    Sodium 135 133 - 144 mmol/L    Potassium 3.0 (L) 3.4 - 5.3 mmol/L    Chloride 101 94 - 109 mmol/L    Carbon Dioxide (CO2) 27 20 - 32 mmol/L    Anion Gap 7 3 - 14 mmol/L    Urea Nitrogen 12 7 - 30 mg/dL    Creatinine 0.81 0.66 - 1.25 mg/dL    Calcium 8.8 8.5 - 10.1 mg/dL    Glucose 101 (H) 70 - 99 mg/dL    Alkaline Phosphatase 45 40 - 150 U/L    AST 25 0 - 45 U/L    ALT 33 0 - 70 U/L    Protein Total 6.3 (L) 6.8 - 8.8 g/dL    Albumin 2.3 (L) 3.4 - 5.0 g/dL    Bilirubin Total 0.6 0.2 - 1.3 mg/dL    GFR Estimate >90 >60 mL/min/1.73m2   Extra Blue Top Tube     Status: None   Result Value Ref Range    Hold Specimen JIC    Extra Red Top Tube     Status: None   Result Value Ref Range    Hold Specimen JIC    Extra Purple Top Tube     Status: None   Result Value Ref Range    Hold Specimen JIC    University Draw     Status: None    Narrative    The following orders were created for panel order University Draw.  Procedure                               Abnormality         Status                     ---------                               -----------         ------                     Extra Blue Top Tube[[id number]]                              Final result               Extra Red Top Tube[[id number]]                               Final result               Extra Purple Top Tube[[id number]]                            Final result                 Please view results for these  tests on the individual orders.   Results for orders placed or performed in visit on 11/26/21   D dimer quantitative     Status: Abnormal   Result Value Ref Range    D-Dimer Quantitative 1.75 (H) 0.00 - 0.50 ug/mL FEU    Narrative    This D-dimer assay is intended for use in conjunction with a clinical pretest probability assessment model to exclude pulmonary embolism (PE) and deep venous thrombosis (DVT) in outpatients suspected of PE or DVT. The cut-off value is 0.50 ug/mL FEU.   CBC with platelets and differential     Status: Abnormal   Result Value Ref Range    WBC Count 12.1 (H) 4.0 - 11.0 10e3/uL    RBC Count 5.27 4.40 - 5.90 10e6/uL    Hemoglobin 15.2 13.3 - 17.7 g/dL    Hematocrit 44.4 40.0 - 53.0 %    MCV 84 78 - 100 fL    MCH 28.8 26.5 - 33.0 pg    MCHC 34.2 31.5 - 36.5 g/dL    RDW 13.5 10.0 - 15.0 %    Platelet Count 331 150 - 450 10e3/uL    % Neutrophils 70 %    % Lymphocytes 15 %    % Monocytes 12 %    % Eosinophils 1 %    % Basophils 0 %    % Immature Granulocytes 2 %    NRBCs per 100 WBC 0 <1 /100    Absolute Neutrophils 8.5 (H) 1.6 - 8.3 10e3/uL    Absolute Lymphocytes 1.8 0.8 - 5.3 10e3/uL    Absolute Monocytes 1.4 (H) 0.0 - 1.3 10e3/uL    Absolute Eosinophils 0.1 0.0 - 0.7 10e3/uL    Absolute Basophils 0.1 0.0 - 0.2 10e3/uL    Absolute Immature Granulocytes 0.2 (H) <=0.0 10e3/uL    Absolute NRBCs 0.0 10e3/uL   CBC with Platelets & Differential     Status: Abnormal    Narrative    The following orders were created for panel order CBC with Platelets & Differential.  Procedure                               Abnormality         Status                     ---------                               -----------         ------                     CBC with platelets and d...[[id number]]  Abnormal            Final result                 Please view results for these tests on the individual orders.       Medications   iopamidol (ISOVUE-370) solution 88 mL (has no administration in time range)   sodium chloride 0.9  % bag 500mL for CT scan flush use (has no administration in time range)     Assessments & Plan (with Medical Decision Making)     I have reviewed the nursing notes.  I have reviewed the findings, diagnosis, plan and need for follow up with the patient.       Discharge Medication List as of 11/26/2021  6:11 PM        Final diagnoses:   Pneumonia due to 2019 novel coronavirus     45-year-old male presents to the emergency department at recommendation of outside provider after having elevated D-dimer earlier today and having several weeks of cough, shortness of breath consistent with his positive COVID-19 test.  He had stable vital signs on arrival. Physical exam findings as described above included crackles throughout both lungs however more prominent on the right side. He did have CMP which showed low potasium of 3.0, total protein 6.3, albumin 2.3. CT scan of his chest did not demonstrate a massive pulmonary embolus however did show diffuse moderate areas of groundglass opacity and developing consolidation consistent with Covid pneumonia. Recommend follow-up in 3 months to ensure resolution and exclude any other underlying pathology, 3 mm pulmonary nodule in the right upper lobe. Discussed findings with patient. He was discharged home stable with instructions for continued symptomatic treatment.  Signs of increasing respiratory distress, worrisome reasons to return to ER sooner discussed.     Disclaimer: This note consists of symbols derived from keyboarding, dictation, and/or voice recognition software. As a result, there may be errors in the script that have gone undetected.  Please consider this when interpreting information found in the chart.    11/26/2021   [facility] EMERGENCY DEPT     Val Cervantes PA-C  12/01/21 [id number]

## 2021-12-01 ASSESSMENT — ENCOUNTER SYMPTOMS
WHEEZING: 1
COLOR CHANGE: 0
COUGH: 1
PALPITATIONS: 0
VOMITING: 0
ACTIVITY CHANGE: 1
WOUND: 0
SHORTNESS OF BREATH: 1
DYSURIA: 0
HEADACHES: 1
CHILLS: 1
CHEST TIGHTNESS: 1
EYE DISCHARGE: 0
DIARRHEA: 1
HEMATURIA: 0
APPETITE CHANGE: 1
FEVER: 1
NAUSEA: 0
PHOTOPHOBIA: 0
EYE ITCHING: 0
ABDOMINAL PAIN: 0
MYALGIAS: 1
EYE PAIN: 0

## 2022-01-02 ENCOUNTER — HEALTH MAINTENANCE LETTER (OUTPATIENT)
Age: 47
End: 2022-01-02

## 2022-03-07 NOTE — TELEPHONE ENCOUNTER
07-Mar-2022 02:51 Thanks Daria,     I would recommend to him to be seen in clinic, please forward it to Dr Lyons as well.     Dr Elizabeth   07-Mar-2022 02:54

## 2022-04-12 ENCOUNTER — LAB (OUTPATIENT)
Dept: LAB | Facility: CLINIC | Age: 47
End: 2022-04-12
Payer: COMMERCIAL

## 2022-04-12 DIAGNOSIS — R53.83 FATIGUE: Primary | ICD-10-CM

## 2022-04-12 PROCEDURE — 36415 COLL VENOUS BLD VENIPUNCTURE: CPT

## 2022-04-12 PROCEDURE — 84270 ASSAY OF SEX HORMONE GLOBUL: CPT

## 2022-04-12 PROCEDURE — 82306 VITAMIN D 25 HYDROXY: CPT

## 2022-04-12 PROCEDURE — 84403 ASSAY OF TOTAL TESTOSTERONE: CPT

## 2022-04-13 LAB
DEPRECATED CALCIDIOL+CALCIFEROL SERPL-MC: 45 UG/L (ref 20–75)
SHBG SERPL-SCNC: 57 NMOL/L (ref 11–80)

## 2022-04-14 LAB
TESTOST FREE SERPL-MCNC: 6.43 NG/DL
TESTOST SERPL-MCNC: 450 NG/DL (ref 240–950)

## 2022-08-26 ENCOUNTER — TELEPHONE (OUTPATIENT)
Dept: TRANSPLANT | Facility: CLINIC | Age: 47
End: 2022-08-26

## 2022-08-26 NOTE — TELEPHONE ENCOUNTER
Spoke with Tru. Is interested in testing to be a possible Liver donor for mother in law.Is abo O.He has spoken with his wife who's also wanting to test to be a donor. Realizes we need to wait now until yesi finishes her eval appts and 's determine if OK to proceed with testing.

## 2022-11-19 ENCOUNTER — HEALTH MAINTENANCE LETTER (OUTPATIENT)
Age: 47
End: 2022-11-19

## 2023-04-09 ENCOUNTER — HEALTH MAINTENANCE LETTER (OUTPATIENT)
Age: 48
End: 2023-04-09

## 2023-07-24 NOTE — TELEPHONE ENCOUNTER
Signed [facility] paper work faxed 5/6/19  [facility]     Negative Screen

## 2023-12-16 ENCOUNTER — TELEPHONE (OUTPATIENT)
Dept: NURSING | Facility: CLINIC | Age: 48
End: 2023-12-16

## 2023-12-16 ENCOUNTER — HOSPITAL ENCOUNTER (EMERGENCY)
Facility: CLINIC | Age: 48
Discharge: HOME | End: 2023-12-16
Attending: PHYSICIAN ASSISTANT | Admitting: PHYSICIAN ASSISTANT
Payer: COMMERCIAL

## 2023-12-16 VITALS
SYSTOLIC BLOOD PRESSURE: 129 MMHG | RESPIRATION RATE: 20 BRPM | DIASTOLIC BLOOD PRESSURE: 79 MMHG | TEMPERATURE: 97.1 F | HEART RATE: 95 BPM | OXYGEN SATURATION: 97 %

## 2023-12-16 DIAGNOSIS — U07.1 INFECTION DUE TO 2019 NOVEL CORONAVIRUS: ICD-10-CM

## 2023-12-16 LAB
FLUAV RNA SPEC QL NAA+PROBE: NEGATIVE
FLUBV RNA RESP QL NAA+PROBE: NEGATIVE
GROUP A STREP BY PCR: NOT DETECTED
RSV RNA SPEC NAA+PROBE: NEGATIVE
SARS-COV-2 RNA RESP QL NAA+PROBE: POSITIVE

## 2023-12-16 PROCEDURE — 87637 SARSCOV2&INF A&B&RSV AMP PRB: CPT | Performed by: PHYSICIAN ASSISTANT

## 2023-12-16 PROCEDURE — 87651 STREP A DNA AMP PROBE: CPT | Performed by: PHYSICIAN ASSISTANT

## 2023-12-16 PROCEDURE — 99213 OFFICE O/P EST LOW 20 MIN: CPT | Performed by: PHYSICIAN ASSISTANT

## 2023-12-16 PROCEDURE — G0463 HOSPITAL OUTPT CLINIC VISIT: HCPCS | Performed by: PHYSICIAN ASSISTANT

## 2023-12-16 ASSESSMENT — ENCOUNTER SYMPTOMS
CHILLS: 1
SORE THROAT: 1
FEVER: 0
MYALGIAS: 1
FATIGUE: 1
GASTROINTESTINAL NEGATIVE: 1
EYES NEGATIVE: 1
CARDIOVASCULAR NEGATIVE: 1
HEADACHES: 1
COUGH: 1

## 2023-12-16 ASSESSMENT — ACTIVITIES OF DAILY LIVING (ADL): ADLS_ACUITY_SCORE: 35

## 2023-12-16 NOTE — LETTER
December 16, 2023      To Whom It May Concern:      Tru Almanza was seen in our Emergency Department today, 12/16/23.  Medical reasons, the patient needs to remain out of work until December 21, 2023 or until symptoms improve.    Sincerely,        Yariel Barroso PA-C

## 2023-12-16 NOTE — TELEPHONE ENCOUNTER
Coronavirus (COVID-19) Notification    Caller Name (Patient, parent, daughter/son, grandparent, etc)  Patient    Reason for call  Notify of Positive Coronavirus (COVID-19) lab results, assess symptoms,  review [facility] recommendations    Lab Result    Lab test:  2019-nCoV rRt-PCR or SARS-CoV-2 PCR    Oropharyngeal AND/OR nasopharyngeal swabs is POSITIVE for 2019-nCoV RNA/SARS-COV-2 PCR (COVID-19 virus)      Gather patient reported symptoms   Assessment   Current Symptoms at time of phone call, reported by patient: (if no symptoms, document: No symptoms] congestion   Date of symptom(s) onset (if applicable)      If at time of call, Patients symptoms have worsened, the Patient should contact 911 or have someone drive them to Emergency Dept promptly:    If Patient calling 911, inform 911 personal that you have tested positive for the Coronavirus (COVID-19).  Place mask on and await 911 to arrive.  If Emergency Dept, If possible, please have another adult drive you to the Emergency Dept but you need to wear mask when in contact with other people.      Treatment Options:   Is patient interested in discussing COVID treatment? No.        Review information with Patient    Your result was positive. This means you have COVID-19 (coronavirus).    How can I protect others?    These guidelines are for isolating before returning to work, school or .    If you DO have symptoms  Stay home and away from others   For at least 5 days after your symptoms started, AND  You are fever free for 24 hours (with no medicine that reduces fever), AND  Your other symptoms are better  Wear a mask for 10 full days anytime you are around others    If you DON'T have symptoms  Stay home and away from others for at least 5 days after your positive test  Wear a mask for 10 full days anytime you are around others    There may be different guidelines for healthcare facilities.  Please check with the specific sites before arriving.    If  you have been told by a doctor that you were severely ill with COVID-19 or are immunocompromised, you should isolate for at least 10 days.    You should not go back to work until you meet the guidelines above for ending your home isolation. You don't need to be retested for COVID-19 before going back to work--studies show that you won't spread the virus if it's been at least 10 days since your symptoms started (or 20 days, if you have a weak immune system).    Employers, schools, and daycares: This is an official notice for this person's medical guidelines for returning in-person.  They must meet the above guidelines before going back to work, school or  in person.    You will receive a positive COVID-19 letter via [employer] or the mail soon with additional self-care information.    Would you like me to review some of that information with you now?  No    If you were tested for an upcoming procedure, please contact your provider for next steps.    Josefa Resendiz

## 2023-12-16 NOTE — ED PROVIDER NOTES
History     Chief Complaint   Patient presents with    Throat Problem     Sx x 4days. Sore throat, cough, fatigue, body aches, chills. Headaches. Upper chest painful with coughing. Productive cough.      HPI  Tru Almanza is a 48 year old male with a nonconcerning past medical history who presents for evaluation of URI symptoms, onset 4 days ago.  Current symptoms include moderate sore throat, cough productive of yellowish phlegm, fatigue, body aches and chills.  No fevers.  Has headaches as well, pain felt primarily in the back of the scalp.  Rates his headache pain is mild currently, having taken ibuprofen this morning.  He has primarily been taking over-the-counter ibuprofen and Tylenol which has helped relieve the sore throat and headaches.  He denies any chest pain or shortness of breath.  No numbness or tingling or weakness in the face or extremities.  No acute vision changes or double vision.  Concerned about having COVID-19 today.  No chest pain or shortness of breath.    Allergies:  Allergies   Allergen Reactions    Erythromycin      At 2 years old       Problem List:    Patient Active Problem List    Diagnosis Date Noted    Acute reaction to stress 07/09/2019     Priority: Medium    Persistent disorder of initiating or maintaining sleep 10/11/2016     Priority: Medium    Pericarditis 09/14/2016     Priority: Medium     9/14/16 Reports past history when he was young          Past Medical History:    Past Medical History:   Diagnosis Date    Pericarditis        Past Surgical History:    Past Surgical History:   Procedure Laterality Date    VASECTOMY  12/12/2011    Procedure:VASECTOMY; Surgical Exploration of the Scrotum,Excision of Vas Deferens Vasectomy Clip,Vasectomy; Surgeon:ALFONSO MCGOVERN; Location:[facility] OR       Family History:    Family History   Problem Relation Age of Onset    Thyroid Disease Mother     Hypertension Father     Breast Cancer Maternal Grandmother        Social History:  Marital  Status:   [2]  Social History     Tobacco Use    Smoking status: Never    Smokeless tobacco: Never   Substance Use Topics    Alcohol use: No    Drug use: No        Medications:    albuterol (PROAIR HFA/PROVENTIL HFA/VENTOLIN HFA) 108 (90 Base) MCG/ACT inhaler  Ibuprofen (ADVIL PO)  order for DME  order for DME          Review of Systems   Constitutional:  Positive for chills and fatigue. Negative for fever.   HENT:  Positive for congestion, ear pain and sore throat.    Eyes: Negative.    Respiratory:  Positive for cough.    Cardiovascular: Negative.    Gastrointestinal: Negative.    Genitourinary: Negative.    Musculoskeletal:  Positive for myalgias.   Neurological:  Positive for headaches.       Physical Exam   BP: 129/79  Pulse: 95  Temp: 97.1  F (36.2  C) (Took ibuprofen at 8 am today\)  Resp: 20  SpO2: 97 %      Physical Exam  Vitals reviewed.   Constitutional:       General: He is not in acute distress.     Appearance: Normal appearance. He is not ill-appearing, toxic-appearing or diaphoretic.   HENT:      Head: Normocephalic and atraumatic.      Right Ear: Ear canal and external ear normal. A middle ear effusion is present. There is no impacted cerumen. Tympanic membrane is injected. Tympanic membrane is not erythematous or bulging.      Left Ear: Tympanic membrane, ear canal and external ear normal. There is no impacted cerumen. Tympanic membrane is not erythematous or bulging.      Nose: Congestion and rhinorrhea present.      Mouth/Throat:      Mouth: Mucous membranes are moist.      Pharynx: Oropharynx is clear. Posterior oropharyngeal erythema present. No oropharyngeal exudate.   Cardiovascular:      Rate and Rhythm: Normal rate and regular rhythm.      Pulses: Normal pulses.      Heart sounds: Normal heart sounds. No murmur heard.  Pulmonary:      Effort: Pulmonary effort is normal. No respiratory distress.      Breath sounds: Normal breath sounds. No stridor. No wheezing, rhonchi or rales.    Chest:      Chest wall: No tenderness.   Musculoskeletal:         General: Normal range of motion.      Cervical back: Normal range of motion and neck supple. No rigidity or tenderness. No muscular tenderness.   Lymphadenopathy:      Cervical: No cervical adenopathy.   Skin:     General: Skin is warm and dry.   Neurological:      Mental Status: He is alert and oriented to person, place, and time.         ED Course                 Procedures         Results for orders placed or performed during the hospital encounter of 12/16/23 (from the past 24 hour(s))   Group A Streptococcus PCR Throat Swab    Specimen: Throat; Swab   Result Value Ref Range    Group A strep by PCR Not Detected Not Detected    Narrative    The Xpert Xpress Strep A test, performed on the [employer]  Instrument Systems, is a rapid, qualitative in vitro diagnostic test for the detection of Streptococcus pyogenes (Group A ß-hemolytic Streptococcus, Strep A) in throat swab specimens from patients with signs and symptoms of pharyngitis. The Xpert Xpress Strep A test can be used as an aid in the diagnosis of Group A Streptococcal pharyngitis. The assay is not intended to monitor treatment for Group A Streptococcus infections. The Xpert Xpress Strep A test utilizes an automated real-time polymerase chain reaction (PCR) to detect Streptococcus pyogenes DNA.   Symptomatic Influenza A/B, RSV, & SARS-CoV2 PCR (COVID-19) Nasopharyngeal    Specimen: Nasopharyngeal; Swab   Result Value Ref Range    Influenza A PCR Negative Negative    Influenza B PCR Negative Negative    RSV PCR Negative Negative    SARS CoV2 PCR Positive (A) Negative    Narrative    Testing was performed using the Xpert Xpress CoV2/Flu/RSV Assay on the [employer] Instrument. This test should be ordered for the detection of SARS-CoV-2, influenza, and RSV viruses in individuals who meet clinical and/or epidemiological criteria. Test performance is unknown in asymptomatic patients. This  test is for in vitro diagnostic use under the FDA EUA for laboratories certified under CLIA to perform high or moderate complexity testing. This test has not been FDA cleared or approved. A negative result does not rule out the presence of PCR inhibitors in the specimen or target RNA in concentration below the limit of detection for the assay. If only one viral target is positive but coinfection with multiple targets is suspected, the sample should be re-tested with another FDA cleared, approved, or authorized test, if coinfection would change clinical management. This test was validated by the [facility]. These laboratories are certified under the Clinical Laboratory Improvement Amendments of 1988 (CLIA-88) as qualified to perform high complexity laboratory testing.       Medications - No data to display    Assessments & Plan (with Medical Decision Making)       Pt having symptoms of a viral URI. The patient tested positive for COVID-19 today. Return to clinic if oxygen saturation is persistently 90% or less with or without activity.  Declines starting Paxlovid today.  No worrisome findings on exam today.  Vital signs are within normal limits.  No breathing concerns.    Recommend isolating for 5 days since symptom onset at a minimum, isolate longer if symptoms persist beyond 5 days, then wait for 24 additional hours after your fever and all other symptoms completely resolve (no use of pain, fever, or other OTC medications for acute symptom relief) before going out in public.  Day 0 is considered the start of symptoms.  May discontinue isolation after day 5 if symptoms improve, wear a mask while in public until day 10 from the start of symptoms.  If symptoms reasonably improved after 10 days, may discontinue wearing the mask     Symptomatic cares include: pushing fluids, rest, OTC cold medication such as Mucinex DM. For the sore throat patient can use salt water gargles, cough drops, chloraseptic  spray/drops and tylenol/ibuprofen. Follow up with PCP if no improvement in 4 to 5 days.     Seek urgent medical evaluation if there are new or worsening symptoms such as fever of 103 degrees F or greater, chest tightness, wheezing, chest pain, shortness of breath, facial pressure, severe headaches, trouble breathing, trouble swallowing, severe or worsening nausea/vomiting, or severe abdominal pain.     Pt/guardian verbalized understanding and agrees with the treatment plan.      I have reviewed the nursing notes.    I have reviewed the findings, diagnosis, plan and need for follow up with the patient.      Discharge Medication List as of 12/16/2023 11:10 AM          Final diagnoses:   Infection due to 2019 novel coronavirus       12/16/2023   [facility] EMERGENCY DEPT       Yariel Barroso PA-C  12/16/23 1300

## 2023-12-29 ENCOUNTER — TELEPHONE (OUTPATIENT)
Dept: FAMILY MEDICINE | Facility: CLINIC | Age: 48
End: 2023-12-29
Payer: COMMERCIAL

## 2024-01-02 NOTE — TELEPHONE ENCOUNTER
Per Dr. Elena Pt needs Virtual Visit since he has not been seen since 2020.  Pt notified and understood. He was going to contact HR first for this.  Jessica [place] Sec

## 2024-06-16 ENCOUNTER — HEALTH MAINTENANCE LETTER (OUTPATIENT)
Age: 49
End: 2024-06-16

## 2024-12-16 NOTE — LETTER
11/30/2018         RE: Tru Almanza  [address] [place] [address] [address]  [facility] [id number]        Dear Colleague,    Thank you for referring your patient, Tru Almanza, to the [facility]. Please see a copy of my visit note below.    Tru returns to the office for reevaluation of the left foot.  The patient relates following the instructions given at the last visit with noted more pain.  The patient relates overall less  improvement in pain and function of the left foot.  The patient relates no other problems.    PAST MEDICAL HISTORY:   Past Medical History:   Diagnosis Date     Pericarditis        BMI= Body mass index is 26.73 kg/(m^2).    Weight management plan: Patient was referred to their PCP to discuss a diet and exercise plan.    Physical Exam:    General: The patient appears to have a pleasant mental affect.    Lower extremity physical exam:  Neurovascular status is intact with palpable pedal pulses and intact epicritic sensations.  Muscular exam is within normal limits to major muscle groups.  Integument is intact.      One notes decreased edema.  One notes pain on palpation over the second metatarsal cuneiform joint on the left      MRI LEFT FOOT WITHOUT CONTRAST   11/15/2018 9:04 AM     HISTORY: 9 months of left foot pain. Bursitis.     COMPARISON: Radiographs on 7/27/2018.     TECHNIQUE: Multiplanar MR imaging was performed through the left  midfoot without contrast. A marker was placed over the site of  clinical concern dorsal to the foot.      FINDINGS: There is extensive marrow edema throughout the majority of  the intermediate cuneiform and to a somewhat lesser degree, the  proximal half of the second metatarsal. A distinct focal fracture line  is not identified. There is mild marrow edema within the lateral  cuneiform. No other abnormal marrow signal intensity is demonstrated.  The joint spaces are well preserved. There is an approximately 1 cm  accessory navicular bone  AC-1000 American Core Series 1st read     1. N-002B 2.5 pet Nickelsulfate hexahydrate  2. A-004 50.0 pet Amerchol L 101  3. N-001 20.0 pet Neomycin sulfate  4. P-014B 0.25 pet Potassium dichromate  5. D-047B 1.0 pet DMDM Hydantoin  6. MX-07 8.0 pet Fragrance mix  7. C-020 20.0 pet Colophonium / (Colophony)  8. MX-03A 12.0 pet Paraben Mix  9. M-035B 0.2 aq METHYLISOTHIAZOLINONE  10. B-001 25.0 pet Myroxylon pereirae resin / (Balsam [place])  11. E-005 1.0 pet Ethylenediamine dihydrochloride  12. C-017A 1.0 pet Cobalt(II) chloride hexahydrate  13. B-024 1.0 pet 4-dmhq-Qeelsqrieuernytnbfqdogz resin  14. E-002 1.0 pet Epoxy resin Bisphenol A  15. MX-06 3.0 pet Carba mix  16. MX-04 0.6 pet Black rubber mix  17. C-009A 0.01 aq Methylisothiazolinone + Methylchloroisothiazolinone / (Cl+-Meisothiazolinone (Kathon CG 100ppm))  18. C-007B 2.0 pet Quaternium-15 (Dowicil 200) / (1-(3-Chloroallyl)-3,5,7-triaza-1-  azoniaadamantane chloride)  19. H-031B 0.5% pet Hydroperoxides of Linalool  20. P-006 1.0 pet p-Phenylenediamine (PPD  21. F-002B 2.0 aq Formaldehyde  22. MX-05B 1.0 pet Mercapto mix  23. B-015B 0.5 pet 2-Bromo-2-nitropropane-l,3-diol (Bronopol)  24. MX-01 1.0 pet Thiuram mix  25. D-044C 1.0 pet Diazolidinylurea (Germall II)  26. B-004 5.0 pet Benzocaine  27. T-031A 1.0 pet Tixocortol-21-pivalate  28. G-005B 2.0 pet Gold(I)sodium thiosulfate dihydrate  29. I-001A 2.0 pet Imidazolidinyl urea Germall 115)  30. B-033A 0.1 pet B-033A Budesonide  31. H-021B 1.0 pet Hydrocortisone-17-butyrate  32. M-003B 1.0 pet 2-Mercaptobenzothiazole (MBT)  33. B-032B 20.0 pet Bacitracin  34. MX-25 14.0 pet Fragrance Mix II  35. MX-26 1.0 pet Disperse Blue 106/124 Mix  36. L-002B 15.0 pet Lidocaine  37. P-019B 30.0 aq Propylene glycol  38. I-008C 0.2 pet Iodopropynyl butyl carbamate  39. P-026 5.0% pet Polymyxin B sulfate  40. C-018 1.0 aq Cocamidopropylbetaine +1  41. MX-24 1.0 pet Mixed dialkyl thiourea  42. D-053 1.0 aq  Dimethylaminopropylamine  43. H-010 2.0 pet HEMA (2-Hydroxyethyl methacrylate)  44. O-005 0.1 aq Oleamidopropyl dimethylamine  45. D-065 5.0 pet Decyl Glucoside  46. M-013 2.0 pet Methyl methacrylate  47. L-001 2.0 pet Lavender absolute/(Lavandula)  48. C-014 1.0 pet Cinnamal / (Cinnamic aldehyde)  49. T-036 100.0 Tocopherol/ (DL alpha tocopherol)  50. E-004 0.1 pet Ethyl acrylate  51. T-035B 5.0 pet Tea Tree Oil  52. C-005 0.5 aq Chlorhexidine digluconate  53. P-022 10.0 pet Propolis  54. C-010B 1.0 pet Chloroxylenol (PCMX) / (4-Chloro-3,5-xylenol (PCMX))  55. H-014C 10.0 pet Benzophenone-3 / (2-Hydroxy-4-methoxybenzophenone)  56. T-010 10.0 pet Toluenesulfonamide formaldehyde resin  57. MX-18 0.1 pet Sesquiterpene lactone mix  58. C-019 0.5 pet Cocamide DANYA / (Coconut diethanolamide)  59. H-032B 0.2% pet Hydroperoxides of Limonene  60. B-027B 0.1 pet Benzalkonium chloride  61. H-023C 2.0 pet Benzophenone-4 / (2-Hydroxy-4-methoxy-benzophenone-5-sulfonic   acid)  62. S-001 5.0 pet Sodium benzoate  63. S-003 2.0 pet Sorbic acid  64. Y-001 2.0 pet [place] odorata oil / (Ylang-Ylang oil)  65. MX-29A 5.0 pet Compositae Mix II  66. NO LONGER AVAILABLE - NOT TESTED  67. S-005 20.0 pet Sorbitan sesquioleate  68. D-022 1.0 pet N,N-Diphenylguanidine  NT 69. L-003 5.0 pet Hydroxyisohexyl 3-Cyclohexene Carboxaldehyde (Lyral)  70. E-027 5.0% pet ETHYLHEXYLGLYCERIN  71. T-030 1.0 pet Triamcinolone acetonide  72. C-028 1.0 pet Clobetasol-17-propionate  73. A-029 0.1 aq Amidoamine  74. E-023 10.0 pet Ethyl cyanoacrylate   75. P-025 1.0 pet 2-Phenoxyethanol  76. D-032 1.0 pet Disperse [place] 3  77. B-005 5.0 pet Benzoic acid  78. D-006 2.0 pet BHT / (2,1-Du-mubx-butyl-4-cresol)  79. E-019C 10.0 pet 2-Ethylhexyl-4-methoxycinnamate (Parsol MCX)  80. B-008B 10.0 soft Benzyl alcohol  81. C-033 20.0 pet Cetearyl alcohol / (Cetyl stearyl alcohol (Cristina O))  82. C-059 2.5 pet [place]  83. B-010B 10.0 pet Benzyl salicylate  84. D-036 1.0  which does not demonstrate any abnormal  marrow signal intensity. No joint effusion is seen.     No tendon or ligament pathology is identified. The plantar fascia is  unremarkable. No abnormal soft tissue mass or fluid collection is  seen.         IMPRESSION:  1. There is prominent marrow edema throughout the majority of the  intermediate cuneiform and to a lesser degree, the proximal half of  the second metatarsal. A distinct fracture line is not seen. These  areas are suspicious for stress fractures or stress reaction. Mild  marrow edema within the lateral cuneiform may represent a similar but  less extensive process.  2. No evidence of bursitis.      JENNY MONTES DE OCA MD  Assessment:      ICD-10-CM    1. Contusion of bone T14.8XXA     Middle cuneiform second metatarsal left foot       Plan:  I have explained to Tru about the conditions.  At this time, the patient will return to wearing his cam boot.  The patient was informed that if pain persists that he may require to be nonweightbearing on the left foot.  Patient will return in 6 weeks for reevaluation.    Disclaimer: This note consists of symbols derived from keyboarding, dictation and/or voice recognition software. As a result, there may be errors in the script that have gone undetected. Please consider this when interpreting information found in this chart.       SHONA Balbuena.DEREK.DION., F.A.C.F.A.S.      Again, thank you for allowing me to participate in the care of your patient.        Sincerely,        Yariel Chisholm DPM     pet Disperse Yellow 3  85. J-002 2.0 pet Emily absolute  86. P-036 2.0 pet Peppermint oil  87. P-039 2.0 pet Pramoxine hydrochloride  88. S-015 20.0 alc Shellac  89. L-004 3.0 pet lauryl polyglucose  90. C-008 1.0 pet p-Chloro-m-cresol / (4-Chloro-3-cresol ([facility]))   91. D-049E 0.5% Methyldibromo Glutaronitrile     92. G-003B 5% Glutaral      93. T-007 0.1% Thimerosol   94. B022 2% 2-tert-butyl-4-methoxyphenol   95. G-004 1% Glyceryl monothioglycolate  96. T-016 2% Triethanolamine  97. Mx-30 6.6% Textile dye mix   98. I-003 20% Isopropyl myristate   99. D-057 1% Desoximetasone  100. P-013 5% Polysorbate 80   101. O-004 0.1% 2-n-Octyl-4-isothiazolin-3-one    102. F003 2% Fusidic acid sodium salt  103. D-005B 2.5% Dibucaine hydrochloride   104. B-007 1% Benzoylperoxide   105. I-009 10% Isoamyl-p-methoxycinnamate   106. O-007A 5% Ethylhexyl Salicylate   1st results  40. C-018 1.0 aq Cocamidopropylbetaine +1

## 2025-05-20 ENCOUNTER — HOSPITAL ENCOUNTER (EMERGENCY)
Facility: CLINIC | Age: 50
Discharge: HOME | End: 2025-05-21
Attending: EMERGENCY MEDICINE
Payer: COMMERCIAL

## 2025-05-20 DIAGNOSIS — R10.84 GENERALIZED ABDOMINAL PAIN: ICD-10-CM

## 2025-05-20 DIAGNOSIS — R93.5 ABNORMAL CT OF THE ABDOMEN: ICD-10-CM

## 2025-05-20 LAB
ALBUMIN SERPL BCG-MCNC: 4.3 G/DL (ref 3.5–5.2)
ALP SERPL-CCNC: 70 U/L (ref 40–150)
ALT SERPL W P-5'-P-CCNC: 27 U/L (ref 0–70)
ANION GAP SERPL CALCULATED.3IONS-SCNC: 14 MMOL/L (ref 7–15)
AST SERPL W P-5'-P-CCNC: 23 U/L (ref 0–45)
ATRIAL RATE - MUSE: 96 BPM
BASOPHILS # BLD AUTO: 0 10E3/UL (ref 0–0.2)
BASOPHILS NFR BLD AUTO: 1 %
BILIRUB SERPL-MCNC: 0.4 MG/DL
BUN SERPL-MCNC: 21.3 MG/DL (ref 6–20)
CALCIUM SERPL-MCNC: 10.1 MG/DL (ref 8.8–10.4)
CHLORIDE SERPL-SCNC: 103 MMOL/L (ref 98–107)
CREAT SERPL-MCNC: 1.04 MG/DL (ref 0.67–1.17)
DIASTOLIC BLOOD PRESSURE - MUSE: NORMAL MMHG
EGFRCR SERPLBLD CKD-EPI 2021: 88 ML/MIN/1.73M2
EOSINOPHIL # BLD AUTO: 0 10E3/UL (ref 0–0.7)
EOSINOPHIL NFR BLD AUTO: 1 %
ERYTHROCYTE [DISTWIDTH] IN BLOOD BY AUTOMATED COUNT: 13.1 % (ref 10–15)
GLUCOSE SERPL-MCNC: 81 MG/DL (ref 70–99)
HCO3 SERPL-SCNC: 26 MMOL/L (ref 22–29)
HCT VFR BLD AUTO: 47.2 % (ref 40–53)
HGB BLD-MCNC: 15.7 G/DL (ref 13.3–17.7)
IMM GRANULOCYTES # BLD: 0 10E3/UL
IMM GRANULOCYTES NFR BLD: 0 %
INTERPRETATION ECG - MUSE: NORMAL
LIPASE SERPL-CCNC: 65 U/L (ref 13–60)
LYMPHOCYTES # BLD AUTO: 1.4 10E3/UL (ref 0.8–5.3)
LYMPHOCYTES NFR BLD AUTO: 35 %
MCH RBC QN AUTO: 29.3 PG (ref 26.5–33)
MCHC RBC AUTO-ENTMCNC: 33.3 G/DL (ref 31.5–36.5)
MCV RBC AUTO: 88 FL (ref 78–100)
MONOCYTES # BLD AUTO: 0 10E3/UL (ref 0–1.3)
MONOCYTES NFR BLD AUTO: 1 %
NEUTROPHILS # BLD AUTO: 2.4 10E3/UL (ref 1.6–8.3)
NEUTROPHILS NFR BLD AUTO: 62 %
NRBC # BLD AUTO: 0 10E3/UL
NRBC BLD AUTO-RTO: 0 /100
P AXIS - MUSE: 50 DEGREES
PLATELET # BLD AUTO: 263 10E3/UL (ref 150–450)
POTASSIUM SERPL-SCNC: 4 MMOL/L (ref 3.4–5.3)
PR INTERVAL - MUSE: 146 MS
PROT SERPL-MCNC: 6.8 G/DL (ref 6.4–8.3)
QRS DURATION - MUSE: 140 MS
QT - MUSE: 362 MS
QTC - MUSE: 457 MS
R AXIS - MUSE: 69 DEGREES
RBC # BLD AUTO: 5.35 10E6/UL (ref 4.4–5.9)
SODIUM SERPL-SCNC: 143 MMOL/L (ref 135–145)
SYSTOLIC BLOOD PRESSURE - MUSE: NORMAL MMHG
T AXIS - MUSE: 27 DEGREES
VENTRICULAR RATE- MUSE: 96 BPM
WBC # BLD AUTO: 3.9 10E3/UL (ref 4–11)

## 2025-05-20 PROCEDURE — 80053 COMPREHEN METABOLIC PANEL: CPT | Performed by: EMERGENCY MEDICINE

## 2025-05-20 PROCEDURE — 99284 EMERGENCY DEPT VISIT MOD MDM: CPT | Performed by: EMERGENCY MEDICINE

## 2025-05-20 PROCEDURE — 83690 ASSAY OF LIPASE: CPT | Performed by: EMERGENCY MEDICINE

## 2025-05-20 PROCEDURE — 36415 COLL VENOUS BLD VENIPUNCTURE: CPT | Performed by: EMERGENCY MEDICINE

## 2025-05-20 PROCEDURE — 258N000003 HC RX IP 258 OP 636: Performed by: EMERGENCY MEDICINE

## 2025-05-20 PROCEDURE — 250N000011 HC RX IP 250 OP 636: Mod: JZ | Performed by: EMERGENCY MEDICINE

## 2025-05-20 PROCEDURE — 93005 ELECTROCARDIOGRAM TRACING: CPT | Performed by: EMERGENCY MEDICINE

## 2025-05-20 PROCEDURE — 96361 HYDRATE IV INFUSION ADD-ON: CPT | Performed by: EMERGENCY MEDICINE

## 2025-05-20 PROCEDURE — 96375 TX/PRO/DX INJ NEW DRUG ADDON: CPT | Performed by: EMERGENCY MEDICINE

## 2025-05-20 PROCEDURE — 85004 AUTOMATED DIFF WBC COUNT: CPT | Performed by: EMERGENCY MEDICINE

## 2025-05-20 PROCEDURE — 93010 ELECTROCARDIOGRAM REPORT: CPT | Performed by: EMERGENCY MEDICINE

## 2025-05-20 PROCEDURE — 250N000011 HC RX IP 250 OP 636: Performed by: EMERGENCY MEDICINE

## 2025-05-20 PROCEDURE — 99285 EMERGENCY DEPT VISIT HI MDM: CPT | Mod: 25 | Performed by: EMERGENCY MEDICINE

## 2025-05-20 RX ORDER — KETOROLAC TROMETHAMINE 15 MG/ML
10 INJECTION, SOLUTION INTRAMUSCULAR; INTRAVENOUS
Status: COMPLETED | OUTPATIENT
Start: 2025-05-20 | End: 2025-05-20

## 2025-05-20 RX ORDER — LORAZEPAM 2 MG/ML
1 INJECTION INTRAMUSCULAR
Status: DISCONTINUED | OUTPATIENT
Start: 2025-05-20 | End: 2025-05-20

## 2025-05-20 RX ORDER — LORAZEPAM 2 MG/ML
0.5 INJECTION INTRAMUSCULAR
Status: DISCONTINUED | OUTPATIENT
Start: 2025-05-20 | End: 2025-05-21 | Stop reason: HOSPADM

## 2025-05-20 RX ORDER — ONDANSETRON 2 MG/ML
4 INJECTION INTRAMUSCULAR; INTRAVENOUS ONCE
Status: COMPLETED | OUTPATIENT
Start: 2025-05-20 | End: 2025-05-20

## 2025-05-20 RX ORDER — SODIUM CHLORIDE, SODIUM LACTATE, POTASSIUM CHLORIDE, CALCIUM CHLORIDE 600; 310; 30; 20 MG/100ML; MG/100ML; MG/100ML; MG/100ML
1000 INJECTION, SOLUTION INTRAVENOUS CONTINUOUS
Status: DISCONTINUED | OUTPATIENT
Start: 2025-05-20 | End: 2025-05-21 | Stop reason: HOSPADM

## 2025-05-20 RX ORDER — IOPAMIDOL 755 MG/ML
116 INJECTION, SOLUTION INTRAVASCULAR ONCE
Status: COMPLETED | OUTPATIENT
Start: 2025-05-21 | End: 2025-05-21

## 2025-05-20 RX ORDER — ONDANSETRON 2 MG/ML
4 INJECTION INTRAMUSCULAR; INTRAVENOUS
Status: COMPLETED | OUTPATIENT
Start: 2025-05-20 | End: 2025-05-20

## 2025-05-20 RX ADMIN — KETOROLAC TROMETHAMINE 10 MG: 15 INJECTION, SOLUTION INTRAMUSCULAR; INTRAVENOUS at 23:42

## 2025-05-20 RX ADMIN — ONDANSETRON 4 MG: 2 INJECTION INTRAMUSCULAR; INTRAVENOUS at 23:29

## 2025-05-20 RX ADMIN — ONDANSETRON 4 MG: 2 INJECTION INTRAMUSCULAR; INTRAVENOUS at 23:42

## 2025-05-20 RX ADMIN — SODIUM CHLORIDE, SODIUM LACTATE, POTASSIUM CHLORIDE, AND CALCIUM CHLORIDE 1000 ML: .6; .31; .03; .02 INJECTION, SOLUTION INTRAVENOUS at 23:41

## 2025-05-20 ASSESSMENT — ENCOUNTER SYMPTOMS
EYES NEGATIVE: 1
VOMITING: 1
NAUSEA: 1
ENDOCRINE NEGATIVE: 1
HEMATOLOGIC/LYMPHATIC NEGATIVE: 1
ALLERGIC/IMMUNOLOGIC NEGATIVE: 1
NEUROLOGICAL NEGATIVE: 1
PSYCHIATRIC NEGATIVE: 1
MUSCULOSKELETAL NEGATIVE: 1
RESPIRATORY NEGATIVE: 1
CARDIOVASCULAR NEGATIVE: 1
ABDOMINAL PAIN: 1
CONSTITUTIONAL NEGATIVE: 1

## 2025-05-20 ASSESSMENT — COLUMBIA-SUICIDE SEVERITY RATING SCALE - C-SSRS
6. HAVE YOU EVER DONE ANYTHING, STARTED TO DO ANYTHING, OR PREPARED TO DO ANYTHING TO END YOUR LIFE?: NO
2. HAVE YOU ACTUALLY HAD ANY THOUGHTS OF KILLING YOURSELF IN THE PAST MONTH?: NO
1. IN THE PAST MONTH, HAVE YOU WISHED YOU WERE DEAD OR WISHED YOU COULD GO TO SLEEP AND NOT WAKE UP?: NO

## 2025-05-21 VITALS
RESPIRATION RATE: 20 BRPM | SYSTOLIC BLOOD PRESSURE: 121 MMHG | BODY MASS INDEX: 28.61 KG/M2 | DIASTOLIC BLOOD PRESSURE: 83 MMHG | TEMPERATURE: 98.4 F | OXYGEN SATURATION: 92 % | WEIGHT: 235 LBS | HEART RATE: 106 BPM

## 2025-05-21 LAB — HOLD SPECIMEN: NORMAL

## 2025-05-21 PROCEDURE — 96361 HYDRATE IV INFUSION ADD-ON: CPT | Performed by: EMERGENCY MEDICINE

## 2025-05-21 PROCEDURE — 250N000009 HC RX 250: Performed by: EMERGENCY MEDICINE

## 2025-05-21 PROCEDURE — 96375 TX/PRO/DX INJ NEW DRUG ADDON: CPT | Performed by: EMERGENCY MEDICINE

## 2025-05-21 PROCEDURE — 250N000011 HC RX IP 250 OP 636: Performed by: EMERGENCY MEDICINE

## 2025-05-21 PROCEDURE — 96365 THER/PROPH/DIAG IV INF INIT: CPT | Mod: 59 | Performed by: EMERGENCY MEDICINE

## 2025-05-21 PROCEDURE — 258N000003 HC RX IP 258 OP 636: Performed by: EMERGENCY MEDICINE

## 2025-05-21 PROCEDURE — 96367 TX/PROPH/DG ADDL SEQ IV INF: CPT | Performed by: EMERGENCY MEDICINE

## 2025-05-21 RX ORDER — CEFTRIAXONE 2 G/1
2 INJECTION, POWDER, FOR SOLUTION INTRAMUSCULAR; INTRAVENOUS EVERY 24 HOURS
Status: DISCONTINUED | OUTPATIENT
Start: 2025-05-21 | End: 2025-05-21 | Stop reason: HOSPADM

## 2025-05-21 RX ORDER — HYDROMORPHONE HYDROCHLORIDE 1 MG/ML
0.5 INJECTION, SOLUTION INTRAMUSCULAR; INTRAVENOUS; SUBCUTANEOUS
Status: DISCONTINUED | OUTPATIENT
Start: 2025-05-21 | End: 2025-05-21 | Stop reason: HOSPADM

## 2025-05-21 RX ORDER — METOCLOPRAMIDE HYDROCHLORIDE 5 MG/ML
5 INJECTION INTRAMUSCULAR; INTRAVENOUS ONCE
Status: COMPLETED | OUTPATIENT
Start: 2025-05-21 | End: 2025-05-21

## 2025-05-21 RX ORDER — ONDANSETRON 4 MG/1
4 TABLET, ORALLY DISINTEGRATING ORAL EVERY 8 HOURS PRN
Qty: 10 TABLET | Refills: 0 | Status: SHIPPED | OUTPATIENT
Start: 2025-05-21

## 2025-05-21 RX ORDER — METRONIDAZOLE 500 MG/100ML
500 INJECTION, SOLUTION INTRAVENOUS ONCE
Status: COMPLETED | OUTPATIENT
Start: 2025-05-21 | End: 2025-05-21

## 2025-05-21 RX ADMIN — SODIUM CHLORIDE 1000 ML: 0.9 INJECTION, SOLUTION INTRAVENOUS at 01:30

## 2025-05-21 RX ADMIN — SODIUM CHLORIDE 69 ML: 9 INJECTION, SOLUTION INTRAVENOUS at 00:12

## 2025-05-21 RX ADMIN — HYDROMORPHONE HYDROCHLORIDE 0.5 MG: 1 INJECTION, SOLUTION INTRAMUSCULAR; INTRAVENOUS; SUBCUTANEOUS at 01:10

## 2025-05-21 RX ADMIN — CEFTRIAXONE 2 G: 2 INJECTION, POWDER, FOR SOLUTION INTRAMUSCULAR; INTRAVENOUS at 01:40

## 2025-05-21 RX ADMIN — LORAZEPAM 0.5 MG: 2 INJECTION INTRAMUSCULAR; INTRAVENOUS at 00:19

## 2025-05-21 RX ADMIN — IOPAMIDOL 116 ML: 755 INJECTION, SOLUTION INTRAVENOUS at 00:11

## 2025-05-21 RX ADMIN — METRONIDAZOLE 500 MG: 500 INJECTION, SOLUTION INTRAVENOUS at 02:12

## 2025-05-21 RX ADMIN — METOCLOPRAMIDE 5 MG: 5 INJECTION, SOLUTION INTRAMUSCULAR; INTRAVENOUS at 01:14

## 2025-05-21 ASSESSMENT — ACTIVITIES OF DAILY LIVING (ADL)
ADLS_ACUITY_SCORE: 41

## 2025-05-21 NOTE — ED TRIAGE NOTES
Patient presents with lower abdominal cramps, nausea and trembling.  Went to bed with stomach cramps and woke up shaking with nausea.  Temp in triage 98.4 po.  Large emesis in triage- zofran given       Triage Assessment (Adult)       Row Name 05/20/25 2320          Triage Assessment    Airway WDL WDL        Respiratory WDL    Respiratory WDL WDL        Skin Circulation/Temperature WDL    Skin Circulation/Temperature WDL WDL        Cardiac WDL    Cardiac WDL WDL        Peripheral/Neurovascular WDL    Peripheral Neurovascular WDL WDL        Cognitive/Neuro/Behavioral WDL    Cognitive/Neuro/Behavioral WDL WDL

## 2025-05-21 NOTE — DISCHARGE INSTRUCTIONS
1) Your evaluation today revealed that your generalized abdominal cramping and discomfort and symptoms of rigors with acute vomiting prior to arriving for care may be related to short segment colitis or diverticulitis given the proximal  descending colon appreciated on imaging.    2) After a period of care he appears stable to trial care at home for management of the symptoms.  We discussed staying hydrated.  You are treated with oral antibiotics and Augmentin after receiving 1 dose of IV antibiotics with ceftriaxone and metronidazole with plan to trial care at home with Augmentin twice a day for the next 7 days. You are also offered nausea medicine for home.  Discussed that if symptoms worsen or if there are new concerns you should return to the

## 2025-05-21 NOTE — ED PROVIDER NOTES
History     Chief Complaint   Patient presents with    Abdominal Pain     HPI  Tur Almanza is a 49 year old male who presents with report of abdominal pain.    On arrival patient  arrived by car with his spouse from home in [facility].  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  Patient reports he takes active medications but has no medication allergies.  He had beef tacos with his family around 8:30 PM tonight.  No reports of contacts at home.  He woke from sleep with sudden abdominal cramping with nausea and vomiting no chest pain no back pain.  No fall or trauma no fever chills no urinary symptoms no prior abdominal surgeries.     Allergies:  Allergies   Allergen Reactions    Erythromycin      At 2 years old       Problem List:    Patient Active Problem List    Diagnosis Date Noted    Acute reaction to stress 07/09/2019     Priority: Medium    Persistent disorder of initiating or maintaining sleep 10/11/2016     Priority: Medium    Pericarditis 09/14/2016     Priority: Medium     9/14/16 Reports past history when he was young          Past Medical History:    Past Medical History:   Diagnosis Date    Pericarditis        Past Surgical History:    Past Surgical History:   Procedure Laterality Date    VASECTOMY  12/12/2011    Procedure:VASECTOMY; Surgical Exploration of the Scrotum,Excision of Vas Deferens Vasectomy Clip,Vasectomy; Surgeon:ALFONSO MCGOVERN; Location:[place] OR       Family History:    Family History   Problem Relation Age of Onset    Thyroid Disease Mother     Hypertension Father     Breast Cancer Maternal Grandmother        Social History:  Marital Status:   [2]  Social History     Tobacco Use    Smoking status: Never    Smokeless tobacco: Never   Substance Use Topics    Alcohol use: No    Drug use: No        Medications:    amoxicillin-clavulanate (AUGMENTIN) 875-125 MG tablet  ondansetron (ZOFRAN ODT) 4 MG ODT tab  albuterol (PROAIR HFA/PROVENTIL HFA/VENTOLIN  HFA) 108 (90 Base) MCG/ACT inhaler  Ibuprofen (ADVIL PO)  order for DME  order for DME          Review of Systems   Constitutional: Negative.    HENT: Negative.     Eyes: Negative.    Respiratory: Negative.     Cardiovascular: Negative.    Gastrointestinal:  Positive for abdominal pain, nausea and vomiting.   Endocrine: Negative.    Genitourinary: Negative.    Musculoskeletal: Negative.    Skin: Negative.    Allergic/Immunologic: Negative.    Neurological: Negative.    Hematological: Negative.    Psychiatric/Behavioral: Negative.     All other systems reviewed and are negative.      Physical Exam   BP: (!) 156/107  Pulse: 92  Temp: 98.4  F (36.9  C)  Resp: 20  Weight: 106.6 kg (235 lb)  SpO2: 100 %      Physical Exam  Constitutional:       Appearance: He is well-developed. He is ill-appearing. He is not toxic-appearing.   HENT:      Head: Normocephalic and atraumatic.   Eyes:      Extraocular Movements: Extraocular movements intact.      Pupils: Pupils are equal, round, and reactive to light.   Cardiovascular:      Rate and Rhythm: Normal rate and regular rhythm.   Pulmonary:      Effort: Pulmonary effort is normal. No respiratory distress.      Breath sounds: Normal breath sounds. No stridor. No wheezing, rhonchi or rales.   Chest:      Chest wall: No tenderness.   Abdominal:      General: Abdomen is flat. Bowel sounds are increased.      Tenderness: There is generalized abdominal tenderness.       Skin:     Capillary Refill: Capillary refill takes less than 2 seconds.   Neurological:      General: No focal deficit present.      Mental Status: He is alert and oriented to person, place, and time.   Psychiatric:         Mood and Affect: Mood normal. Mood is not anxious or depressed.         Behavior: Behavior normal.         ED Course        Procedures              EKG Interpretation:      Interpreted by Jesus Peña MD  Time reviewed: 2340  Symptoms at time of EKG: Nausea vomiting, abdominal  cramping  Rhythm: normal sinus   Rate: Normal  Axis: Normal  Ectopy: none  Conduction: right bundle branch block (complete)  ST Segments/ T Waves: Non-specific ST-T wave changes  Q Waves: nonspecific  Comparison to prior: When compared with EKG dated 11/11/2019 no acute change.  Right bundle branch block is known    Clinical Impression: RBBB no acute from recent viewable EKG for comparison        Critical Care time:  none          ED medications:  Medications   lactated ringers infusion 1,000 mL (1,000 mLs Intravenous Not Given 5/21/25 0058)   LORazepam (ATIVAN) injection 0.5 mg (0.5 mg Intravenous $Given 5/21/25 0019)   cefTRIAXone (ROCEPHIN) 2 g vial to attach to  ml bag for ADULTS or NS 50 ml bag for PEDS (has no administration in time range)   metroNIDAZOLE (FLAGYL) infusion 500 mg (has no administration in time range)   sodium chloride 0.9% BOLUS 1,000 mL (has no administration in time range)   metoclopramide (REGLAN) injection 5 mg (has no administration in time range)   HYDROmorphone (PF) (DILAUDID) injection 0.5 mg (has no administration in time range)   ondansetron (ZOFRAN) injection 4 mg (4 mg Intravenous $Given 5/20/25 2329)   lactated ringers BOLUS 1,000 mL (1,000 mLs Intravenous $New Bag 5/20/25 2341)   ondansetron (ZOFRAN) injection 4 mg (4 mg Intravenous $Given 5/20/25 2342)   ketorolac (TORADOL) injection 10 mg (10 mg Intravenous $Given 5/20/25 2342)   iopamidol (ISOVUE-370) solution 116 mL (116 mLs Intravenous $Given 5/21/25 0011)   sodium chloride 0.9 % bag for CT scan flush (69 mLs Intravenous $Given 5/21/25 0012)        ED vitals:  Vitals:    05/20/25 2323 05/20/25 2330 05/21/25 0001 05/21/25 0036   BP: (!) 156/107 (!) 162/98 (!) 143/85 (!) 146/94   Pulse: 92 90 87    Resp: 20      Temp: 98.4  F (36.9  C)      SpO2: 100% 100% 97% 97%   Weight: 106.6 kg (235 lb)            ED labs and imaging:  Results for orders placed or performed during the hospital encounter of 05/20/25 (from the past 24  hours)   Comprehensive metabolic panel   Result Value Ref Range    Sodium 143 135 - 145 mmol/L    Potassium 4.0 3.4 - 5.3 mmol/L    Carbon Dioxide (CO2) 26 22 - 29 mmol/L    Anion Gap 14 7 - 15 mmol/L    Urea Nitrogen 21.3 (H) 6.0 - 20.0 mg/dL    Creatinine 1.04 0.67 - 1.17 mg/dL    GFR Estimate 88 >60 mL/min/1.73m2    Calcium 10.1 8.8 - 10.4 mg/dL    Chloride 103 98 - 107 mmol/L    Glucose 81 70 - 99 mg/dL    Alkaline Phosphatase 70 40 - 150 U/L    AST 23 0 - 45 U/L    ALT 27 0 - 70 U/L    Protein Total 6.8 6.4 - 8.3 g/dL    Albumin 4.3 3.5 - 5.2 g/dL    Bilirubin Total 0.4 <=1.2 mg/dL   CBC with platelets, differential    Narrative    The following orders were created for panel order CBC with platelets, differential.  Procedure                               Abnormality         Status                     ---------                               -----------         ------                     CBC with platelets and ...[[id number]]  Abnormal            Final result                 Please view results for these tests on the individual orders.   Lipase   Result Value Ref Range    Lipase 65 (H) 13 - 60 U/L   CBC with platelets and differential   Result Value Ref Range    WBC Count 3.9 (L) 4.0 - 11.0 10e3/uL    RBC Count 5.35 4.40 - 5.90 10e6/uL    Hemoglobin 15.7 13.3 - 17.7 g/dL    Hematocrit 47.2 40.0 - 53.0 %    MCV 88 78 - 100 fL    MCH 29.3 26.5 - 33.0 pg    MCHC 33.3 31.5 - 36.5 g/dL    RDW 13.1 10.0 - 15.0 %    Platelet Count 263 150 - 450 10e3/uL    % Neutrophils 62 %    % Lymphocytes 35 %    % Monocytes 1 %    % Eosinophils 1 %    % Basophils 1 %    % Immature Granulocytes 0 %    NRBCs per 100 WBC 0 <1 /100    Absolute Neutrophils 2.4 1.6 - 8.3 10e3/uL    Absolute Lymphocytes 1.4 0.8 - 5.3 10e3/uL    Absolute Monocytes 0.0 0.0 - 1.3 10e3/uL    Absolute Eosinophils 0.0 0.0 - 0.7 10e3/uL    Absolute Basophils 0.0 0.0 - 0.2 10e3/uL    Absolute Immature Granulocytes 0.0 <=0.4 10e3/uL    Absolute NRBCs 0.0 10e3/uL    [place] Draw    Narrative    The following orders were created for panel order [place] Draw.  Procedure                               Abnormality         Status                     ---------                               -----------         ------                     Extra Blue Top Tube[[id number]]                             Final result                 Please view results for these tests on the individual orders.   Extra Blue Top Tube   Result Value Ref Range    Hold Specimen JIC    EKG 12 lead   Result Value Ref Range    Systolic Blood Pressure  mmHg    Diastolic Blood Pressure  mmHg    Ventricular Rate 96 BPM    Atrial Rate 96 BPM    AK Interval 146 ms    QRS Duration 140 ms     ms    QTc 457 ms    P Axis 50 degrees    R AXIS 69 degrees    T Axis 27 degrees    Interpretation ECG       Sinus rhythm  Right bundle branch block  Abnormal ECG  When compared with ECG of 17-Feb-2006 07:24,  Vent. rate has increased by  34 bpm  Right bundle branch block is now Present     CT Abdomen Pelvis w Contrast    Narrative    EXAM: CT ABDOMEN PELVIS W CONTRAST  LOCATION: [facility]  DATE: 05/21/2025    INDICATION: Acute diffuse generalized abdominal pain with nausea and vomiting. Evaluate for acute process and/or catastrophe.  COMPARISON: None available.  TECHNIQUE: CT scan of the abdomen and pelvis was performed after the injection of 116 mL Isovue 370 intravenously. Multiplanar reformats were obtained. Dose reduction techniques were used.    FINDINGS:   LOWER CHEST: Bibasilar pulmonary opacities, likely atelectasis. 5 mm right middle lobe nodule (series 3 image 9).    ABDOMEN/PELVIS:  HEPATOBILIARY: No suspicious focal hepatic lesion. No radiodense gallstones.    PANCREAS: No main pancreatic ductal dilatation or definite solid pancreatic mass.    SPLEEN: Borderline splenomegaly with the spleen measuring approximately 13 cm in craniocaudal dimension. Splenule along the spleen.    ADRENAL  GLANDS: No adrenal nodules.    KIDNEYS/BLADDER: No radiodense kidney/ureteral stones or hydronephrosis in either kidney.    BOWEL: No abnormally dilated bowel loops. Colonic diverticulosis with mild colonic wall thickening and pericolonic fat stranding along the proximal descending colon, findings likely related to acute diverticulitis vs short-segment colitis. No evidence of   colonic perforation or pericolonic loculated collection.    PERITONEUM: No evidence of free fluid in the abdomen and pelvis. No free peritoneal or portal venous gas.    PELVIC ORGANS: Unremarkable.    VASCULATURE: Unremarkable.    LYMPH NODES: No significant abdominopelvic lymphadenopathy.    MUSCULOSKELETAL: No suspicious osseous lesion.      Impression    IMPRESSION:  1.  Colonic diverticulosis with mild colonic wall thickening and pericolonic fat stranding along the proximal descending colon, findings likely related to acute diverticulitis vs short-segment colitis. No evidence of colonic perforation or pericolonic   loculated collection.  2.  Borderline splenomegaly.  3.  A 5 mm right middle lobe nodule; as per Fleischner Society criteria, for a low-risk patient no routine followup is recommended and for a high-risk patient optional chest CT in 12 months can be considered.           Assessments & Plan (with Medical Decision Making)   Assessment Summary and clinical impression: 49-year-old who arrived by car with sudden onset of abdominal cramp with nausea and vomiting after eating beef tacos for dinner with his family a few hours prior. Patient on intake was captured to be afebrile.  Blood pressure was 156/107 100% on room air patient had large-volume emesis on arrival.  EKG revealed normal sinus rhythm with right bundle branch block.  He reported no chest pain or back pain no shortness of breath and no epigastric discomfort.  Abdominal cramping.  Given no other household contact with symptoms who ate similar meal low suspicion for  foodborne illness.  He was monitored and given fluids and therapies to manage her symptoms.  Workup revealed findings of colonic diverticulosis with mild colonic wall thickening and pericolonic fat stranding along the proximal descending colon, findings likely related to acute diverticulitis vs short-segment colitis on abdominal imaging with contrast.  At shift end patient signed out to the overnight provider hopes that he will likely be discharged while receiving ongoing therapies to manage his discomfort. He received a one-time dose of IV antibiotics with ceftriaxone and metronidazole for intra-abdominal infection with short segment colitis and diverticulitis. He was offered Zofran for nausea for home and Augmentin for empiric antimicrobial coverage if his symptoms improve and he is able to be discharged.  You may require admission for observation if symptoms do not resolve or improve enough to go home.     ED course and plan:  Reviewed the medical record.  With abdominal cramping vomiting workup was broadened to ensure symptoms were not related to an emergent condition or process including intra-abdominal catastrophe.  EKG on arrival revealed normal sinus rhythm with regular branch block-which is known with no acute change from comparison from 11/11/19.  After therapies given to manage his symptoms his workup mild leukopenia.  3.9.  Normal electrolytes.  Normal liver enzymes.  Lipase was 65  Which are within options for care given patient's constellation of symptoms with  reported rigors, abdominal cramping  patient and spouse agreed to proceed with abdominal imaging.    Abdominal imaging with contrast revealed concern for colonic diverticulosis with mild colonic wall thickening and pericolonic fat stranding along the proximal descending colon, findings likely related to acute diverticulitis vs short-segment colitis. No evidence of colonic perforation or pericolonic loculated collection. Radiology also noted   Borderline splenomegaly., A 5 mm right middle lobe nodule; as per Fleischner Society criteria, for a low-risk patient no routine followup is recommended and for a high-risk patient optional chest CT in 12 months can be considered.  After period of care reviewed options for clinical trial care at home with oral medications versus more prolonged observation in the department.  Patient was offered additional antiemetics and a dose of IV antibiotics (ceftriaxone and metronidazole) with anticipation that he will be able to go home with plan to trial treatment as an outpatient with Augmentin based on CT interpretation    At shift end patient signed out to Dr Gonzales- overnight provider with plan to anticipate discharge as patient symptoms patient's improved further or resolve on oral antibiotics with Augmentin and Zofran ODT for nausea management.  If there is a change in clinical status which would necessitate more therapies or intervention he may require boarding in the ED overnight and or observation admission for further care.  Anticipated plan of care and handoff was reviewed with the patient's and spouse present with him at the bedside.      Disclaimer: This note consists of symbols derived from keyboarding, dictation and/or voice recognition software. As a result, there may be errors in the script that have gone undetected. Please consider this when interpreting information found in this chart.   I have reviewed the nursing notes.    I have reviewed the findings, diagnosis, plan and need for follow up with the patient.           Medical Decision Making  The patient's presentation was of high complexity (acute generalized abdominal With nausea vomiting,).    The patient's evaluation involved:  ordering and/or review of 3+ test(s) in this encounter (intravenous medications to manage her symptoms, EKG, frequent vital signs and telemetry, blood work abdominal imaging, )    The patient's management necessitated high risk  (anticipate trial of care as an outpatient.  May require observation admission if intractable pain, and/or symptoms).        New Prescriptions    AMOXICILLIN-CLAVULANATE (AUGMENTIN) 875-125 MG TABLET    Take 1 tablet by mouth 2 times daily for 7 days.    ONDANSETRON (ZOFRAN ODT) 4 MG ODT TAB    Take 1 tablet (4 mg) by mouth every 8 hours as needed for nausea.       Final diagnoses:   Generalized abdominal pain - Acute onset prior to ED arrival.  May be related to short segment colitis and/or acute diverticulitis involving the proximal descending colon   Abnormal CT of the abdomen - IMPRESSION: 1.  Colonic diverticulosis with mild colonic wall thickening and pericolonic fat stranding along the proximal descending colon, findings likely related to acute diverticulitis vs short-segment colitis. No evidence of colonic perforation or pericolonic  loculated collection. 2.  Borderline splenomegaly. 3.  A 5 mm right middle lobe nodule; as per Fleischner Society criteria, for a low-risk patient no routine followup is recommended and for a high-risk patient optional chest CT in 12 months can be considered.       5/20/2025   [facility] EMERGENCY DEPT       Jesus Peña MD  05/21/25 0106

## 2025-05-24 LAB
ATRIAL RATE - MUSE: 96 BPM
DIASTOLIC BLOOD PRESSURE - MUSE: NORMAL MMHG
INTERPRETATION ECG - MUSE: NORMAL
P AXIS - MUSE: 50 DEGREES
PR INTERVAL - MUSE: 146 MS
QRS DURATION - MUSE: 140 MS
QT - MUSE: 362 MS
QTC - MUSE: 457 MS
R AXIS - MUSE: 69 DEGREES
SYSTOLIC BLOOD PRESSURE - MUSE: NORMAL MMHG
T AXIS - MUSE: 27 DEGREES
VENTRICULAR RATE- MUSE: 96 BPM

## 2025-06-21 ENCOUNTER — HEALTH MAINTENANCE LETTER (OUTPATIENT)
Age: 50
End: 2025-06-21